# Patient Record
Sex: FEMALE | Race: WHITE | Employment: FULL TIME | URBAN - METROPOLITAN AREA
[De-identification: names, ages, dates, MRNs, and addresses within clinical notes are randomized per-mention and may not be internally consistent; named-entity substitution may affect disease eponyms.]

---

## 2017-02-01 ENCOUNTER — TELEPHONE (OUTPATIENT)
Dept: FAMILY MEDICINE CLINIC | Age: 57
End: 2017-02-01

## 2017-02-01 ENCOUNTER — OFFICE VISIT (OUTPATIENT)
Dept: FAMILY MEDICINE CLINIC | Age: 57
End: 2017-02-01

## 2017-02-01 VITALS
OXYGEN SATURATION: 96 % | WEIGHT: 256 LBS | DIASTOLIC BLOOD PRESSURE: 68 MMHG | SYSTOLIC BLOOD PRESSURE: 128 MMHG | BODY MASS INDEX: 42.65 KG/M2 | HEART RATE: 66 BPM | HEIGHT: 65 IN

## 2017-02-01 DIAGNOSIS — E66.01 MORBID OBESITY, UNSPECIFIED OBESITY TYPE (HCC): ICD-10-CM

## 2017-02-01 DIAGNOSIS — E03.9 ACQUIRED HYPOTHYROIDISM: Primary | ICD-10-CM

## 2017-02-01 DIAGNOSIS — Z13.9 SCREENING: ICD-10-CM

## 2017-02-01 DIAGNOSIS — E03.9 ACQUIRED HYPOTHYROIDISM: ICD-10-CM

## 2017-02-01 LAB
T3 FREE: 3.3 PG/ML (ref 2.3–4.2)
T4 FREE: 1.3 NG/DL (ref 0.9–1.8)
TSH SERPL DL<=0.05 MIU/L-ACNC: 5.01 UIU/ML (ref 0.27–4.2)

## 2017-02-01 PROCEDURE — 99204 OFFICE O/P NEW MOD 45 MIN: CPT | Performed by: FAMILY MEDICINE

## 2017-02-01 RX ORDER — LEVOTHYROXINE SODIUM 0.03 MG/1
25 TABLET ORAL DAILY
COMMUNITY
End: 2017-02-01

## 2017-02-01 RX ORDER — LEVOTHYROXINE SODIUM 0.05 MG/1
50 TABLET ORAL DAILY
Qty: 30 TABLET | Refills: 3 | Status: SHIPPED | OUTPATIENT
Start: 2017-02-01 | End: 2017-04-21 | Stop reason: DRUGHIGH

## 2017-02-01 ASSESSMENT — ENCOUNTER SYMPTOMS
NAUSEA: 0
SHORTNESS OF BREATH: 0
CONSTIPATION: 0
EYE REDNESS: 0
DIARRHEA: 0
VOMITING: 0

## 2017-02-02 ENCOUNTER — TELEPHONE (OUTPATIENT)
Dept: FAMILY MEDICINE CLINIC | Age: 57
End: 2017-02-02

## 2017-02-04 LAB — T3 REVERSE: 18.7 NG/DL (ref 9–27)

## 2017-04-07 ENCOUNTER — OFFICE VISIT (OUTPATIENT)
Dept: FAMILY MEDICINE CLINIC | Age: 57
End: 2017-04-07

## 2017-04-07 VITALS
DIASTOLIC BLOOD PRESSURE: 80 MMHG | BODY MASS INDEX: 42.15 KG/M2 | HEIGHT: 65 IN | SYSTOLIC BLOOD PRESSURE: 124 MMHG | HEART RATE: 81 BPM | WEIGHT: 253 LBS | OXYGEN SATURATION: 96 %

## 2017-04-07 DIAGNOSIS — E03.9 ACQUIRED HYPOTHYROIDISM: Primary | ICD-10-CM

## 2017-04-07 DIAGNOSIS — E66.01 MORBID OBESITY, UNSPECIFIED OBESITY TYPE (HCC): ICD-10-CM

## 2017-04-07 LAB
T4 FREE: 1.4 NG/DL (ref 0.9–1.8)
TSH SERPL DL<=0.05 MIU/L-ACNC: 4.83 UIU/ML (ref 0.27–4.2)

## 2017-04-07 PROCEDURE — 99214 OFFICE O/P EST MOD 30 MIN: CPT | Performed by: FAMILY MEDICINE

## 2017-04-07 ASSESSMENT — ENCOUNTER SYMPTOMS
EYE REDNESS: 0
COLOR CHANGE: 0
NAUSEA: 0
EYE DISCHARGE: 0
ABDOMINAL PAIN: 0
SHORTNESS OF BREATH: 0

## 2017-04-21 ENCOUNTER — OFFICE VISIT (OUTPATIENT)
Dept: FAMILY MEDICINE CLINIC | Age: 57
End: 2017-04-21

## 2017-04-21 VITALS
OXYGEN SATURATION: 94 % | HEIGHT: 65 IN | BODY MASS INDEX: 41.65 KG/M2 | HEART RATE: 68 BPM | SYSTOLIC BLOOD PRESSURE: 122 MMHG | DIASTOLIC BLOOD PRESSURE: 78 MMHG | WEIGHT: 250 LBS

## 2017-04-21 DIAGNOSIS — E03.9 ACQUIRED HYPOTHYROIDISM: Primary | ICD-10-CM

## 2017-04-21 DIAGNOSIS — R79.89 ELEVATED TSH: ICD-10-CM

## 2017-04-21 DIAGNOSIS — E66.01 MORBID OBESITY, UNSPECIFIED OBESITY TYPE (HCC): ICD-10-CM

## 2017-04-21 PROCEDURE — 99214 OFFICE O/P EST MOD 30 MIN: CPT | Performed by: FAMILY MEDICINE

## 2017-04-21 RX ORDER — LEVOTHYROXINE SODIUM 0.07 MG/1
75 TABLET ORAL DAILY
Qty: 30 TABLET | Refills: 2 | Status: SHIPPED | OUTPATIENT
Start: 2017-04-21 | End: 2017-07-18 | Stop reason: SDUPTHER

## 2017-04-21 ASSESSMENT — PATIENT HEALTH QUESTIONNAIRE - PHQ9
1. LITTLE INTEREST OR PLEASURE IN DOING THINGS: 0
SUM OF ALL RESPONSES TO PHQ QUESTIONS 1-9: 0
2. FEELING DOWN, DEPRESSED OR HOPELESS: 0
SUM OF ALL RESPONSES TO PHQ9 QUESTIONS 1 & 2: 0

## 2017-04-21 ASSESSMENT — ENCOUNTER SYMPTOMS
SHORTNESS OF BREATH: 0
ABDOMINAL PAIN: 0
NAUSEA: 0
EYE REDNESS: 0
EYE DISCHARGE: 0
COLOR CHANGE: 0

## 2017-06-06 ENCOUNTER — OFFICE VISIT (OUTPATIENT)
Dept: FAMILY MEDICINE CLINIC | Age: 57
End: 2017-06-06

## 2017-06-06 VITALS
DIASTOLIC BLOOD PRESSURE: 78 MMHG | SYSTOLIC BLOOD PRESSURE: 128 MMHG | HEIGHT: 65 IN | BODY MASS INDEX: 41.32 KG/M2 | HEART RATE: 72 BPM | WEIGHT: 248 LBS | OXYGEN SATURATION: 93 %

## 2017-06-06 DIAGNOSIS — M79.601 ARM PAIN, LATERAL, RIGHT: Primary | ICD-10-CM

## 2017-06-06 DIAGNOSIS — Z13.9 SCREENING: ICD-10-CM

## 2017-06-06 PROCEDURE — 99214 OFFICE O/P EST MOD 30 MIN: CPT | Performed by: FAMILY MEDICINE

## 2017-06-06 ASSESSMENT — ENCOUNTER SYMPTOMS
SHORTNESS OF BREATH: 0
COLOR CHANGE: 0
NAUSEA: 0
ABDOMINAL PAIN: 0
EYE REDNESS: 0
EYE DISCHARGE: 0

## 2017-06-08 ENCOUNTER — HOSPITAL ENCOUNTER (OUTPATIENT)
Dept: MAMMOGRAPHY | Age: 57
Discharge: HOME | End: 2017-06-08
Admitting: FAMILY MEDICINE

## 2017-06-08 DIAGNOSIS — Z12.31 ENCOUNTER FOR SCREENING MAMMOGRAM FOR BREAST CANCER: ICD-10-CM

## 2017-06-08 DIAGNOSIS — M79.601 ARM PAIN, LATERAL, RIGHT: ICD-10-CM

## 2017-06-28 ENCOUNTER — OFFICE VISIT (OUTPATIENT)
Dept: FAMILY MEDICINE CLINIC | Age: 57
End: 2017-06-28

## 2017-06-28 VITALS
BODY MASS INDEX: 41.15 KG/M2 | HEIGHT: 65 IN | DIASTOLIC BLOOD PRESSURE: 80 MMHG | OXYGEN SATURATION: 94 % | HEART RATE: 69 BPM | WEIGHT: 247 LBS | SYSTOLIC BLOOD PRESSURE: 116 MMHG

## 2017-06-28 DIAGNOSIS — L03.115 CELLULITIS OF RIGHT LOWER EXTREMITY: Primary | ICD-10-CM

## 2017-06-28 DIAGNOSIS — Z13.9 SCREENING: ICD-10-CM

## 2017-06-28 PROCEDURE — 99214 OFFICE O/P EST MOD 30 MIN: CPT | Performed by: FAMILY MEDICINE

## 2017-06-28 RX ORDER — CEPHALEXIN 500 MG/1
500 CAPSULE ORAL 2 TIMES DAILY
Qty: 20 CAPSULE | Refills: 0 | Status: SHIPPED | OUTPATIENT
Start: 2017-06-28 | End: 2017-09-15 | Stop reason: ALTCHOICE

## 2017-06-28 ASSESSMENT — ENCOUNTER SYMPTOMS
ABDOMINAL PAIN: 0
SHORTNESS OF BREATH: 0
NAUSEA: 0
COLOR CHANGE: 1
EYE REDNESS: 0
EYE DISCHARGE: 0

## 2017-07-17 DIAGNOSIS — E03.9 ACQUIRED HYPOTHYROIDISM: ICD-10-CM

## 2017-07-17 LAB
T4 FREE: 1.4 NG/DL (ref 0.9–1.8)
TSH SERPL DL<=0.05 MIU/L-ACNC: 3.64 UIU/ML (ref 0.27–4.2)

## 2017-07-18 RX ORDER — LEVOTHYROXINE SODIUM 0.07 MG/1
75 TABLET ORAL DAILY
Qty: 30 TABLET | Refills: 11 | Status: SHIPPED | OUTPATIENT
Start: 2017-07-18 | End: 2018-01-02

## 2017-08-29 DIAGNOSIS — E03.9 ACQUIRED HYPOTHYROIDISM: Primary | ICD-10-CM

## 2017-09-01 DIAGNOSIS — E03.9 ACQUIRED HYPOTHYROIDISM: ICD-10-CM

## 2017-09-01 LAB — TSH SERPL DL<=0.05 MIU/L-ACNC: 4.03 UIU/ML (ref 0.27–4.2)

## 2017-09-15 ENCOUNTER — OFFICE VISIT (OUTPATIENT)
Dept: FAMILY MEDICINE CLINIC | Age: 57
End: 2017-09-15

## 2017-09-15 VITALS
HEIGHT: 65 IN | WEIGHT: 242 LBS | DIASTOLIC BLOOD PRESSURE: 86 MMHG | BODY MASS INDEX: 40.32 KG/M2 | SYSTOLIC BLOOD PRESSURE: 130 MMHG | OXYGEN SATURATION: 94 % | HEART RATE: 71 BPM

## 2017-09-15 DIAGNOSIS — E03.9 ACQUIRED HYPOTHYROIDISM: ICD-10-CM

## 2017-09-15 DIAGNOSIS — E66.01 MORBID OBESITY, UNSPECIFIED OBESITY TYPE (HCC): ICD-10-CM

## 2017-09-15 DIAGNOSIS — E03.9 ACQUIRED HYPOTHYROIDISM: Primary | ICD-10-CM

## 2017-09-15 DIAGNOSIS — M79.10 MYALGIA: ICD-10-CM

## 2017-09-15 LAB — TSH SERPL DL<=0.05 MIU/L-ACNC: 4.12 UIU/ML (ref 0.27–4.2)

## 2017-09-15 PROCEDURE — 99214 OFFICE O/P EST MOD 30 MIN: CPT | Performed by: FAMILY MEDICINE

## 2017-09-15 PROCEDURE — 90715 TDAP VACCINE 7 YRS/> IM: CPT | Performed by: FAMILY MEDICINE

## 2017-09-15 PROCEDURE — 90472 IMMUNIZATION ADMIN EACH ADD: CPT | Performed by: FAMILY MEDICINE

## 2017-09-15 PROCEDURE — 90688 IIV4 VACCINE SPLT 0.5 ML IM: CPT | Performed by: FAMILY MEDICINE

## 2017-09-15 PROCEDURE — 90471 IMMUNIZATION ADMIN: CPT | Performed by: FAMILY MEDICINE

## 2017-09-15 ASSESSMENT — ENCOUNTER SYMPTOMS
EYE DISCHARGE: 0
ABDOMINAL PAIN: 0
EYE REDNESS: 0
NAUSEA: 0
COLOR CHANGE: 0
SHORTNESS OF BREATH: 0

## 2017-09-18 DIAGNOSIS — E03.9 ACQUIRED HYPOTHYROIDISM: Primary | ICD-10-CM

## 2017-09-18 LAB — T4 FREE: 1.5 NG/DL (ref 0.9–1.8)

## 2017-11-29 ENCOUNTER — OFFICE VISIT (OUTPATIENT)
Dept: FAMILY MEDICINE CLINIC | Age: 57
End: 2017-11-29

## 2017-11-29 VITALS
OXYGEN SATURATION: 95 % | SYSTOLIC BLOOD PRESSURE: 130 MMHG | WEIGHT: 240 LBS | HEIGHT: 65 IN | BODY MASS INDEX: 39.99 KG/M2 | DIASTOLIC BLOOD PRESSURE: 86 MMHG | TEMPERATURE: 101.4 F | HEART RATE: 60 BPM

## 2017-11-29 DIAGNOSIS — J20.9 ACUTE BRONCHITIS, UNSPECIFIED ORGANISM: Primary | ICD-10-CM

## 2017-11-29 DIAGNOSIS — R05.9 COUGH: ICD-10-CM

## 2017-11-29 PROCEDURE — 99213 OFFICE O/P EST LOW 20 MIN: CPT | Performed by: NURSE PRACTITIONER

## 2017-11-29 RX ORDER — AZITHROMYCIN 250 MG/1
250 TABLET, FILM COATED ORAL DAILY
Qty: 6 TABLET | Refills: 0 | Status: SHIPPED | OUTPATIENT
Start: 2017-11-29 | End: 2017-12-20

## 2017-11-29 RX ORDER — BENZONATATE 100 MG/1
100 CAPSULE ORAL 3 TIMES DAILY PRN
Qty: 21 CAPSULE | Refills: 0 | Status: SHIPPED | OUTPATIENT
Start: 2017-11-29 | End: 2017-12-06

## 2017-11-29 RX ORDER — METHYLPREDNISOLONE 4 MG/1
TABLET ORAL
Qty: 1 KIT | Refills: 0 | Status: SHIPPED | OUTPATIENT
Start: 2017-11-29 | End: 2017-12-20

## 2017-11-29 RX ORDER — DEXTROMETHORPHAN HYDROBROMIDE AND PROMETHAZINE HYDROCHLORIDE 15; 6.25 MG/5ML; MG/5ML
5 SYRUP ORAL 4 TIMES DAILY PRN
Qty: 118 ML | Refills: 0 | Status: SHIPPED | OUTPATIENT
Start: 2017-11-29 | End: 2017-12-20 | Stop reason: SDUPTHER

## 2017-11-29 ASSESSMENT — ENCOUNTER SYMPTOMS
SHORTNESS OF BREATH: 1
SORE THROAT: 1
COUGH: 1
RHINORRHEA: 1
WHEEZING: 0

## 2017-11-29 NOTE — LETTER
[address]  [address] [place] [address], [facility] [address]  Phone: [phone]  Fax: [phone]    Brittany Barreto, [facility]        November 29, 2017     Patient: Shawn Del Rosario   YOB: 1960   Date of Visit: 11/29/2017       To Whom it May Concern:    Shawn Del Rosario was seen in my clinic on 11/29/2017 for illness. She has been off of work since 11/27/17 due to illness. She may return to work when she has been free from fever for 24 hours. If you have any questions or concerns, please don't hesitate to call.     Sincerely,         Brittany Barreto, CNP

## 2017-11-29 NOTE — PROGRESS NOTES
Subjective:      Patient ID: Marisa Silva is a 55 y.o. female. Ana Maria is here for sore throat and cough that started 4 days ago and has progressively worsened. Cough is productive of green tinged sputum and frequent throughout the day and night. She is also having muscle aches and fatigue. Has shortness of breath with coughing and walking. Has not been able to work and cough keeps her up at night. Was exposed to a family member who was sick on Saturday. Cough   This is a new problem. Episode onset: 4 days ago. The problem has been gradually worsening. The problem occurs every few minutes. The cough is productive of sputum. Associated symptoms include chest pain, myalgias, nasal congestion, postnasal drip, rhinorrhea, a sore throat and shortness of breath. Pertinent negatives include no chills, ear pain, headaches, rash or wheezing. Associated symptoms comments: Itchy ears. Exacerbated by: worse in the morning and at night  Treatments tried: mucinex, dayquil, nyquil, afrin. The treatment provided mild relief. There is no history of asthma. Review of Systems   Constitutional: Negative for chills. HENT: Positive for postnasal drip, rhinorrhea and sore throat. Negative for ear pain. Respiratory: Positive for cough and shortness of breath. Negative for wheezing. Cardiovascular: Positive for chest pain. Musculoskeletal: Positive for myalgias. Skin: Negative for rash. Neurological: Negative for headaches. Vitals:    11/29/17 1034   BP: 130/86   Site: Left Arm   Position: Sitting   Pulse: 60   Temp: 101.4 °F (38.6 °C)   TempSrc: Oral   SpO2: 95%   Weight: 240 lb (108.9 kg)   Height: 5' 5\" (1.651 m)     Objective:   Physical Exam   Constitutional: She is oriented to person, place, and time. She appears well-developed and well-nourished. No distress. HENT:   Head: Normocephalic and atraumatic. Right Ear: Hearing, external ear and ear canal normal. Tympanic membrane is erythematous.    Left Ear: Hearing, external ear and ear canal normal. Tympanic membrane is erythematous. Nose: Rhinorrhea present. Mouth/Throat: Posterior oropharyngeal erythema present. No oropharyngeal exudate, posterior oropharyngeal edema or tonsillar abscesses. Neck: Normal range of motion. Neck supple. No tracheal deviation present. No thyromegaly present. Cardiovascular: Normal rate, regular rhythm and normal heart sounds. Exam reveals no gallop and no friction rub. No murmur heard. Pulmonary/Chest: Effort normal and breath sounds normal. No stridor. No respiratory distress. She has no wheezes. She has no rales. She exhibits no tenderness. Lymphadenopathy:     She has no cervical adenopathy. Neurological: She is alert and oriented to person, place, and time. Skin: Skin is warm and dry. She is not diaphoretic. Nursing note and vitals reviewed. Assessment/Plan:   Ana Maria was seen today for cough. Diagnoses and all orders for this visit:    1. Acute bronchitis, unspecified organism  - azithromycin (ZITHROMAX) 250 MG tablet; Take 1 tablet by mouth daily 500MG PO ON DAY 1 250 MG DAILY ON DAYS 2-5  Dispense: 6 tablet; Refill: 0  - benzonatate (TESSALON PERLES) 100 MG capsule; Take 1 capsule by mouth 3 times daily as needed for Cough  Dispense: 21 capsule; Refill: 0  - promethazine-dextromethorphan (PROMETHAZINE-DM) 6.25-15 MG/5ML syrup; Take 5 mLs by mouth 4 times daily as needed for Cough  Dispense: 118 mL; Refill: 0  - methylPREDNISolone (MEDROL DOSEPACK) 4 MG tablet; Take by mouth. Dispense: 1 kit; Refill: 0    2.  Cough  - RAPID INFLUENZA A/B ANTIGENS; Future- Negative    Patient Instructions   Z pack- antibiotic  Medrol (Do not take any NSAID's such as ibuprofen or aleve while on this medication)  Promethazine DM at night for cough  Tessalon perles for cough during the day  Follow up Monday if no better    Outpatient Encounter Prescriptions as of 11/29/2017   Medication Sig Dispense Refill    azithromycin (ZITHROMAX) 250 MG tablet Take 1 tablet by mouth daily 500MG PO ON DAY 1 250 MG DAILY ON DAYS 2-5 6 tablet 0    benzonatate (TESSALON PERLES) 100 MG capsule Take 1 capsule by mouth 3 times daily as needed for Cough 21 capsule 0    promethazine-dextromethorphan (PROMETHAZINE-DM) 6.25-15 MG/5ML syrup Take 5 mLs by mouth 4 times daily as needed for Cough 118 mL 0    methylPREDNISolone (MEDROL DOSEPACK) 4 MG tablet Take by mouth. 1 kit 0    levothyroxine (SYNTHROID) 75 MCG tablet Take 1 tablet by mouth daily 30 tablet 11     No facility-administered encounter medications on file as of 11/29/2017.

## 2017-11-29 NOTE — PATIENT INSTRUCTIONS
Z pack- antibiotic  Medrol (Do not take any NSAID's such as ibuprofen or aleve while on this medication)  Promethazine DM at night for cough  Tessalon perles for cough during the day  Follow up Monday if no better

## 2017-12-20 ENCOUNTER — OFFICE VISIT (OUTPATIENT)
Dept: FAMILY MEDICINE CLINIC | Age: 57
End: 2017-12-20

## 2017-12-20 VITALS
HEIGHT: 65 IN | WEIGHT: 242 LBS | SYSTOLIC BLOOD PRESSURE: 122 MMHG | DIASTOLIC BLOOD PRESSURE: 82 MMHG | BODY MASS INDEX: 40.32 KG/M2 | OXYGEN SATURATION: 96 % | HEART RATE: 81 BPM

## 2017-12-20 DIAGNOSIS — J20.9 ACUTE BRONCHITIS, UNSPECIFIED ORGANISM: ICD-10-CM

## 2017-12-20 PROCEDURE — 99213 OFFICE O/P EST LOW 20 MIN: CPT | Performed by: NURSE PRACTITIONER

## 2017-12-20 RX ORDER — AZITHROMYCIN 250 MG/1
250 TABLET, FILM COATED ORAL DAILY
Qty: 6 TABLET | Refills: 0 | Status: SHIPPED | OUTPATIENT
Start: 2017-12-20 | End: 2017-12-27 | Stop reason: ALTCHOICE

## 2017-12-20 RX ORDER — METHYLPREDNISOLONE 4 MG/1
TABLET ORAL
Qty: 1 KIT | Refills: 0 | Status: SHIPPED | OUTPATIENT
Start: 2017-12-20 | End: 2017-12-27 | Stop reason: ALTCHOICE

## 2017-12-20 RX ORDER — DEXTROMETHORPHAN HYDROBROMIDE AND PROMETHAZINE HYDROCHLORIDE 15; 6.25 MG/5ML; MG/5ML
5 SYRUP ORAL 4 TIMES DAILY PRN
Qty: 118 ML | Refills: 0 | Status: SHIPPED | OUTPATIENT
Start: 2017-12-20 | End: 2017-12-27 | Stop reason: ALTCHOICE

## 2017-12-20 ASSESSMENT — ENCOUNTER SYMPTOMS
COUGH: 1
CHEST TIGHTNESS: 0
RHINORRHEA: 1
STRIDOR: 0
SHORTNESS OF BREATH: 0
CHOKING: 0
SINUS PAIN: 1
HOARSE VOICE: 0
FACIAL SWELLING: 0
WHEEZING: 0
SINUS COMPLAINT: 1
SINUS PRESSURE: 1
SORE THROAT: 1

## 2017-12-20 NOTE — PROGRESS NOTES
EOM are normal. Pupils are equal, round, and reactive to light. Neck: Normal range of motion. Neck supple. Cardiovascular: Normal rate, regular rhythm and normal heart sounds. Exam reveals no gallop and no friction rub. No murmur heard. Pulmonary/Chest: Effort normal and breath sounds normal. No respiratory distress. She has no wheezes. She has no rales. She exhibits no tenderness. Lymphadenopathy:     She has no cervical adenopathy. Neurological: She is oriented to person, place, and time. No cranial nerve deficit. Skin: Skin is warm and dry. She is not diaphoretic. Nursing note and vitals reviewed. Assessment:   1. Acute bronchitis, unspecified organism  - methylPREDNISolone (MEDROL DOSEPACK) 4 MG tablet; Take by mouth. Dispense: 1 kit; Refill: 0  - promethazine-dextromethorphan (PROMETHAZINE-DM) 6.25-15 MG/5ML syrup; Take 5 mLs by mouth 4 times daily as needed for Cough  Dispense: 118 mL; Refill: 0  - azithromycin (ZITHROMAX) 250 MG tablet; Take 1 tablet by mouth daily 500MG PO ON DAY 1 250 MG DAILY ON DAYS 2-5  Dispense: 6 tablet; Refill: 0     Plan:     Patient Instructions   Medrol dose pack (oral steroid) no NSAIDs while taking  Promethazine-DM for cough  Azithromycin   Follow up in 1 week if no better    Outpatient Encounter Prescriptions as of 12/20/2017   Medication Sig Dispense Refill    methylPREDNISolone (MEDROL DOSEPACK) 4 MG tablet Take by mouth.  1 kit 0    promethazine-dextromethorphan (PROMETHAZINE-DM) 6.25-15 MG/5ML syrup Take 5 mLs by mouth 4 times daily as needed for Cough 118 mL 0    azithromycin (ZITHROMAX) 250 MG tablet Take 1 tablet by mouth daily 500MG PO ON DAY 1 250 MG DAILY ON DAYS 2-5 6 tablet 0    levothyroxine (SYNTHROID) 75 MCG tablet Take 1 tablet by mouth daily 30 tablet 11    [DISCONTINUED] azithromycin (ZITHROMAX) 250 MG tablet Take 1 tablet by mouth daily 500MG PO ON DAY 1 250 MG DAILY ON DAYS 2-5 6 tablet 0    [DISCONTINUED] promethazine-dextromethorphan (PROMETHAZINE-DM) 6.25-15 MG/5ML syrup Take 5 mLs by mouth 4 times daily as needed for Cough 118 mL 0    [DISCONTINUED] methylPREDNISolone (MEDROL DOSEPACK) 4 MG tablet Take by mouth. 1 kit 0     No facility-administered encounter medications on file as of 12/20/2017.

## 2017-12-27 ENCOUNTER — HOSPITAL ENCOUNTER (OUTPATIENT)
Dept: GENERAL RADIOLOGY | Age: 57
Discharge: HOME | End: 2017-12-27
Attending: INTERNAL MEDICINE | Admitting: INTERNAL MEDICINE

## 2017-12-27 ENCOUNTER — OFFICE VISIT (OUTPATIENT)
Dept: ENDOCRINOLOGY | Age: 57
End: 2017-12-27

## 2017-12-27 VITALS
OXYGEN SATURATION: 94 % | BODY MASS INDEX: 40.85 KG/M2 | TEMPERATURE: 97.5 F | DIASTOLIC BLOOD PRESSURE: 84 MMHG | WEIGHT: 245.2 LBS | HEART RATE: 64 BPM | HEIGHT: 65 IN | RESPIRATION RATE: 12 BRPM | SYSTOLIC BLOOD PRESSURE: 126 MMHG

## 2017-12-27 DIAGNOSIS — E03.9 ACQUIRED HYPOTHYROIDISM: ICD-10-CM

## 2017-12-27 DIAGNOSIS — R20.2 NUMBNESS AND TINGLING: ICD-10-CM

## 2017-12-27 DIAGNOSIS — R20.0 NUMBNESS AND TINGLING: ICD-10-CM

## 2017-12-27 DIAGNOSIS — E55.9 VITAMIN D DEFICIENCY: ICD-10-CM

## 2017-12-27 DIAGNOSIS — E03.9 ACQUIRED HYPOTHYROIDISM: Primary | ICD-10-CM

## 2017-12-27 LAB
FOLATE: 17.48 NG/ML (ref 4.78–24.2)
T4 FREE: 1.3 NG/DL (ref 0.9–1.8)
THYROID PEROXIDASE (TPO) ABS: 81 IU/ML
TSH SERPL DL<=0.05 MIU/L-ACNC: 5.21 UIU/ML (ref 0.27–4.2)
VITAMIN B-12: 788 PG/ML (ref 211–911)
VITAMIN D 25-HYDROXY: 45.5 NG/ML

## 2017-12-27 PROCEDURE — 99244 OFF/OP CNSLTJ NEW/EST MOD 40: CPT | Performed by: INTERNAL MEDICINE

## 2017-12-27 NOTE — PROGRESS NOTES
Endocrinology       New Patient Consultation  Alicia Pierre M.D. Phone: [phone]   FAX: [phone]       [address]   YOB: 1960    Date of Visit:  12/27/2017    No Known Allergies  No outpatient prescriptions have been marked as taking for the 12/27/17 encounter (Office Visit) with Nakul Angelo MD.         Vitals:    12/27/17 1303   BP: 126/84   Site: Right Arm   Position: Sitting   Cuff Size: Large Adult   Pulse: 64   Resp: 12   Temp: 97.5 °F (36.4 °C)   TempSrc: Oral   SpO2: 94%   Weight: 245 lb 3.2 oz (111.2 kg)   Height: 5' 4.69\" (1.643 m)     Body mass index is 41.2 kg/m². Wt Readings from Last 3 Encounters:   12/27/17 245 lb 3.2 oz (111.2 kg)   12/20/17 242 lb (109.8 kg)   11/29/17 240 lb (108.9 kg)     BP Readings from Last 3 Encounters:   12/27/17 126/84   12/20/17 122/82   11/29/17 130/86        Past Medical History:   Diagnosis Date    Acquired hypothyroidism 2/1/2017    Hyperthyroidism     Morbid obesity ([address] 2/1/2017     Past Surgical History:   Procedure Laterality Date    BACK SURGERY  2010     Family History   Problem Relation Age of Onset    Heart Disease Mother      History   Smoking Status    Former Smoker    Quit date: 2/1/2010   Smokeless Tobacco    Never Used      History   Alcohol Use No       HPI      Bria Sharp is a 65 y.o. female who is here for initial evaluation of thyroid disease  Patient is being seen at the request of Ladonna Mccormick DO        Diagnosed with Hypothyroidism in 2016  Current thyroid medication: levothyroxine (Synthroid)- 75  mcg daily  Takes it first thing in the morning on empty stomach- yes  Interfering medications including calcium, vitamin D , iron tablets, Proton pump inhibitors: no    FH of hypothyroidism: no  FH of thyroid cancer: no      C/o fatigue, weight gain , muscle weakness, constipation      Review of system Please see the scanned document filled by the patient, reviewed  by me today.      Physical Exam

## 2017-12-27 NOTE — Clinical Note
Dear Dr. Ian Hill,  Thank you so much for involving me in the care of your patient. Please review the enclosed note. Feel free to call me with any questions.  Warm Regards, Delories Leventhal

## 2018-01-02 RX ORDER — LEVOTHYROXINE SODIUM 88 MCG
88 TABLET ORAL DAILY
Qty: 30 TABLET | Refills: 3 | Status: SHIPPED | OUTPATIENT
Start: 2018-01-02 | End: 2018-04-30 | Stop reason: SDUPTHER

## 2018-04-04 ENCOUNTER — OFFICE VISIT (OUTPATIENT)
Dept: FAMILY MEDICINE CLINIC | Age: 58
End: 2018-04-04

## 2018-04-04 VITALS
DIASTOLIC BLOOD PRESSURE: 80 MMHG | HEART RATE: 68 BPM | OXYGEN SATURATION: 98 % | SYSTOLIC BLOOD PRESSURE: 122 MMHG | WEIGHT: 241 LBS | BODY MASS INDEX: 41.15 KG/M2 | HEIGHT: 64 IN

## 2018-04-04 DIAGNOSIS — M54.2 NECK PAIN: ICD-10-CM

## 2018-04-04 DIAGNOSIS — M62.838 MUSCLE SPASMS OF NECK: Primary | ICD-10-CM

## 2018-04-04 PROCEDURE — 99213 OFFICE O/P EST LOW 20 MIN: CPT | Performed by: NURSE PRACTITIONER

## 2018-04-04 RX ORDER — METHOCARBAMOL 500 MG/1
500 TABLET, FILM COATED ORAL 3 TIMES DAILY
Qty: 30 TABLET | Refills: 0 | Status: SHIPPED | OUTPATIENT
Start: 2018-04-04 | End: 2018-04-27 | Stop reason: ALTCHOICE

## 2018-04-04 ASSESSMENT — ENCOUNTER SYMPTOMS
PHOTOPHOBIA: 0
TROUBLE SWALLOWING: 0
VISUAL CHANGE: 0

## 2018-04-27 ENCOUNTER — OFFICE VISIT (OUTPATIENT)
Dept: ENDOCRINOLOGY | Age: 58
End: 2018-04-27

## 2018-04-27 ENCOUNTER — HOSPITAL ENCOUNTER (OUTPATIENT)
Dept: GENERAL RADIOLOGY | Age: 58
Discharge: HOME | End: 2018-04-27
Attending: INTERNAL MEDICINE | Admitting: INTERNAL MEDICINE

## 2018-04-27 VITALS
HEIGHT: 64 IN | HEART RATE: 70 BPM | SYSTOLIC BLOOD PRESSURE: 135 MMHG | DIASTOLIC BLOOD PRESSURE: 77 MMHG | OXYGEN SATURATION: 95 % | BODY MASS INDEX: 41.15 KG/M2 | WEIGHT: 241 LBS

## 2018-04-27 DIAGNOSIS — E03.9 ACQUIRED HYPOTHYROIDISM: Primary | ICD-10-CM

## 2018-04-27 DIAGNOSIS — E03.9 ACQUIRED HYPOTHYROIDISM: ICD-10-CM

## 2018-04-27 LAB
T4 FREE: 1.5 NG/DL (ref 0.9–1.8)
TSH SERPL DL<=0.05 MIU/L-ACNC: 2.77 UIU/ML (ref 0.27–4.2)

## 2018-04-27 PROCEDURE — 99213 OFFICE O/P EST LOW 20 MIN: CPT | Performed by: INTERNAL MEDICINE

## 2018-04-27 ASSESSMENT — ENCOUNTER SYMPTOMS
COUGH: 0
DOUBLE VISION: 0
BACK PAIN: 0
BLURRED VISION: 0
HEMOPTYSIS: 0
PHOTOPHOBIA: 0
ORTHOPNEA: 0

## 2018-04-27 ASSESSMENT — PATIENT HEALTH QUESTIONNAIRE - PHQ9
1. LITTLE INTEREST OR PLEASURE IN DOING THINGS: 0
SUM OF ALL RESPONSES TO PHQ QUESTIONS 1-9: 0
SUM OF ALL RESPONSES TO PHQ9 QUESTIONS 1 & 2: 0
2. FEELING DOWN, DEPRESSED OR HOPELESS: 0

## 2018-04-30 RX ORDER — LEVOTHYROXINE SODIUM 88 MCG
88 TABLET ORAL DAILY
Qty: 30 TABLET | Refills: 5 | Status: SHIPPED | OUTPATIENT
Start: 2018-04-30 | End: 2018-11-05 | Stop reason: SDUPTHER

## 2018-05-10 ENCOUNTER — TELEPHONE (OUTPATIENT)
Dept: OTHER | Facility: CLINIC | Age: 58
End: 2018-05-10

## 2018-07-19 ENCOUNTER — OFFICE VISIT (OUTPATIENT)
Dept: FAMILY MEDICINE CLINIC | Age: 58
End: 2018-07-19

## 2018-07-19 VITALS
HEART RATE: 74 BPM | WEIGHT: 232 LBS | SYSTOLIC BLOOD PRESSURE: 118 MMHG | HEIGHT: 64 IN | DIASTOLIC BLOOD PRESSURE: 86 MMHG | OXYGEN SATURATION: 96 % | BODY MASS INDEX: 39.61 KG/M2

## 2018-07-19 DIAGNOSIS — L50.9 HIVE: Primary | ICD-10-CM
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
## 2018-07-19 DIAGNOSIS — Z12.11 COLON CANCER SCREENING: ICD-10-CM

## 2018-07-19 PROCEDURE — 99213 OFFICE O/P EST LOW 20 MIN: CPT | Performed by: NURSE PRACTITIONER

## 2018-07-19 RX ORDER — METHYLPREDNISOLONE 4 MG/1
TABLET ORAL
Qty: 1 KIT | Refills: 0 | Status: SHIPPED | OUTPATIENT
Start: 2018-07-19 | End: 2018-10-23 | Stop reason: ALTCHOICE

## 2018-07-19 RX ORDER — HYDROXYZINE HYDROCHLORIDE 25 MG/1
25 TABLET, FILM COATED ORAL 3 TIMES DAILY PRN
Qty: 30 TABLET | Refills: 0 | Status: SHIPPED | OUTPATIENT
Start: 2018-07-19 | End: 2018-10-23 | Stop reason: ALTCHOICE

## 2018-07-19 ASSESSMENT — ENCOUNTER SYMPTOMS
WHEEZING: 0
COLOR CHANGE: 1
COUGH: 0
STRIDOR: 0
SHORTNESS OF BREATH: 0

## 2018-09-14 ENCOUNTER — HOSPITAL ENCOUNTER (OUTPATIENT)
Dept: MAMMOGRAPHY | Age: 58
Discharge: HOME | End: 2018-09-14
Payer: COMMERCIAL

## 2018-09-14 DIAGNOSIS — Z12.31 ENCOUNTER FOR SCREENING MAMMOGRAM FOR BREAST CANCER: ICD-10-CM

## 2018-09-14 PROCEDURE — 77067 SCR MAMMO BI INCL CAD: CPT

## 2018-10-23 ENCOUNTER — OFFICE VISIT (OUTPATIENT)
Dept: FAMILY MEDICINE CLINIC | Age: 58
End: 2018-10-23
Payer: COMMERCIAL

## 2018-10-23 VITALS
OXYGEN SATURATION: 96 % | HEIGHT: 64 IN | HEART RATE: 69 BPM | BODY MASS INDEX: 39.95 KG/M2 | SYSTOLIC BLOOD PRESSURE: 130 MMHG | WEIGHT: 234 LBS | DIASTOLIC BLOOD PRESSURE: 100 MMHG

## 2018-10-23 DIAGNOSIS — Z12.11 SCREEN FOR COLON CANCER: ICD-10-CM

## 2018-10-23 DIAGNOSIS — Z87.891 HISTORY OF SMOKING 10-25 PACK YEARS: ICD-10-CM

## 2018-10-23 DIAGNOSIS — K14.8 TONGUE LESION: Primary | ICD-10-CM

## 2018-10-23 PROCEDURE — 99213 OFFICE O/P EST LOW 20 MIN: CPT | Performed by: FAMILY MEDICINE

## 2018-10-23 ASSESSMENT — ENCOUNTER SYMPTOMS
FACIAL SWELLING: 0
EYE REDNESS: 0
NAUSEA: 0
SHORTNESS OF BREATH: 0
CONSTIPATION: 0
DIARRHEA: 0
VOMITING: 0
TROUBLE SWALLOWING: 0

## 2018-11-02 ENCOUNTER — HOSPITAL ENCOUNTER (OUTPATIENT)
Age: 58
Discharge: HOME | End: 2018-11-02
Payer: COMMERCIAL

## 2018-11-02 ENCOUNTER — OFFICE VISIT (OUTPATIENT)
Dept: ENDOCRINOLOGY | Age: 58
End: 2018-11-02
Payer: COMMERCIAL

## 2018-11-02 VITALS
DIASTOLIC BLOOD PRESSURE: 83 MMHG | RESPIRATION RATE: 12 BRPM | WEIGHT: 235.4 LBS | SYSTOLIC BLOOD PRESSURE: 119 MMHG | HEIGHT: 64 IN | HEART RATE: 66 BPM | BODY MASS INDEX: 40.19 KG/M2 | OXYGEN SATURATION: 96 %

## 2018-11-02 DIAGNOSIS — E03.9 ACQUIRED HYPOTHYROIDISM: Primary | ICD-10-CM

## 2018-11-02 DIAGNOSIS — E03.9 ACQUIRED HYPOTHYROIDISM: ICD-10-CM

## 2018-11-02 LAB
T4 FREE: 1.6 NG/DL (ref 0.9–1.8)
TSH SERPL DL<=0.05 MIU/L-ACNC: 2.8 UIU/ML (ref 0.27–4.2)

## 2018-11-02 PROCEDURE — 99213 OFFICE O/P EST LOW 20 MIN: CPT | Performed by: INTERNAL MEDICINE

## 2018-11-02 PROCEDURE — 84443 ASSAY THYROID STIM HORMONE: CPT

## 2018-11-02 PROCEDURE — 84439 ASSAY OF FREE THYROXINE: CPT

## 2018-11-02 PROCEDURE — 36415 COLL VENOUS BLD VENIPUNCTURE: CPT

## 2018-11-02 ASSESSMENT — ENCOUNTER SYMPTOMS
ORTHOPNEA: 0
HEMOPTYSIS: 0
DOUBLE VISION: 0
PHOTOPHOBIA: 0
COUGH: 0
BACK PAIN: 0
BLURRED VISION: 0

## 2018-11-02 NOTE — PROGRESS NOTES
of Systems   Constitutional: Positive for malaise/fatigue. Negative for chills, diaphoresis, fever and weight loss. Eyes: Negative for blurred vision, double vision and photophobia. Respiratory: Negative for cough and hemoptysis. Cardiovascular: Negative for chest pain, palpitations and orthopnea. Genitourinary: Negative for dysuria, flank pain, frequency, hematuria and urgency. Musculoskeletal: Positive for myalgias. Negative for back pain, falls, joint pain and neck pain. Skin: Negative for itching and rash. Neurological: Positive for headaches. Negative for dizziness, tingling, tremors, sensory change, speech change, focal weakness, seizures and loss of consciousness. Endo/Heme/Allergies: Negative for environmental allergies and polydipsia. Does not bruise/bleed easily. Psychiatric/Behavioral: Positive for depression. Negative for hallucinations, memory loss, substance abuse and suicidal ideas. The patient is not nervous/anxious and does not have insomnia. Physical Exam   Constitutional: She is oriented to person, place, and time. She appears well-developed. No distress. HENT:   Mouth/Throat: Oropharynx is clear and moist.   Eyes: EOM are normal.   Neck: No thyromegaly present. Cardiovascular: Normal rate and normal heart sounds. Pulmonary/Chest: Effort normal. No respiratory distress. She has no wheezes. Abdominal: Soft. Bowel sounds are normal. There is no tenderness. Musculoskeletal: She exhibits no edema. Neurological: She is alert and oriented to person, place, and time. Skin: Skin is warm and dry. She is not diaphoretic. Psychiatric: Her behavior is normal. Thought content normal.     Lab Results   Component Value Date    TSH 2.77 04/27/2018    TSH 5.21 (H) 12/27/2017    TSH 4.12 09/15/2017    TSH 4.03 09/01/2017    TSH 3.64 07/17/2017    TSH 4.83 (H) 04/07/2017    TSH 5.01 (H) 02/01/2017           Assessment/Plan      1.  Hypothyroidism    This 65 yrs old female has h/o hypothyroidism    Etiology is hashimoto's thyroiditis. She is currently on synthroid 88 mcg daily. She is feeling more tired. Has experienced hair loss. Will repeat labs today      2 Vitamin D def. She takes vitamin D 5000 IU daily. Normal levels in 12/17 (45.4)      Results via my chart.

## 2018-11-03 ENCOUNTER — PATIENT MESSAGE (OUTPATIENT)
Dept: ENDOCRINOLOGY | Age: 58
End: 2018-11-03

## 2018-11-05 ENCOUNTER — OFFICE VISIT (OUTPATIENT)
Dept: ENT CLINIC | Age: 58
End: 2018-11-05
Payer: COMMERCIAL

## 2018-11-05 VITALS
BODY MASS INDEX: 38.65 KG/M2 | HEIGHT: 65 IN | DIASTOLIC BLOOD PRESSURE: 74 MMHG | SYSTOLIC BLOOD PRESSURE: 127 MMHG | TEMPERATURE: 98.6 F | HEART RATE: 66 BPM | WEIGHT: 232 LBS

## 2018-11-05 DIAGNOSIS — K14.8 MASS OF TONGUE: Primary | ICD-10-CM

## 2018-11-05 PROCEDURE — 99243 OFF/OP CNSLTJ NEW/EST LOW 30: CPT | Performed by: OTOLARYNGOLOGY

## 2018-11-05 RX ORDER — LEVOTHYROXINE SODIUM 88 MCG
88 TABLET ORAL DAILY
Qty: 30 TABLET | Refills: 5 | Status: SHIPPED | OUTPATIENT
Start: 2018-11-05 | End: 2019-05-21 | Stop reason: SDUPTHER

## 2018-12-28 ENCOUNTER — OFFICE VISIT (OUTPATIENT)
Dept: FAMILY MEDICINE CLINIC | Age: 58
End: 2018-12-28
Payer: COMMERCIAL

## 2018-12-28 VITALS
WEIGHT: 238.4 LBS | HEIGHT: 64 IN | OXYGEN SATURATION: 95 % | SYSTOLIC BLOOD PRESSURE: 125 MMHG | HEART RATE: 62 BPM | DIASTOLIC BLOOD PRESSURE: 82 MMHG | BODY MASS INDEX: 40.7 KG/M2

## 2018-12-28 DIAGNOSIS — M62.838 MASSETER MUSCLE SPASM: Primary | ICD-10-CM

## 2018-12-28 PROCEDURE — 99213 OFFICE O/P EST LOW 20 MIN: CPT | Performed by: PHYSICIAN ASSISTANT

## 2018-12-28 RX ORDER — IBUPROFEN 800 MG/1
800 TABLET ORAL EVERY 6 HOURS PRN
Qty: 60 TABLET | Refills: 0 | Status: SHIPPED | OUTPATIENT
Start: 2018-12-28 | End: 2019-05-03 | Stop reason: ALTCHOICE

## 2018-12-28 RX ORDER — CYCLOBENZAPRINE HCL 5 MG
5 TABLET ORAL 3 TIMES DAILY PRN
Qty: 30 TABLET | Refills: 0 | Status: SHIPPED | OUTPATIENT
Start: 2018-12-28 | End: 2018-12-31 | Stop reason: DRUGHIGH

## 2018-12-28 ASSESSMENT — ENCOUNTER SYMPTOMS
VOMITING: 0
DIARRHEA: 0
SHORTNESS OF BREATH: 0
SORE THROAT: 0
NAUSEA: 0
COUGH: 0
ABDOMINAL PAIN: 0
CONSTIPATION: 0
RHINORRHEA: 0

## 2018-12-28 NOTE — PROGRESS NOTES
Height: 5' 4\" (1.626 m)     Estimated body mass index is 40.92 kg/m² as calculated from the following:    Height as of this encounter: 5' 4\" (1.626 m). Weight as of this encounter: 238 lb 6.4 oz (108.1 kg). Physical Exam   Constitutional: She is oriented to person, place, and time. She appears well-developed and well-nourished. No distress. HENT:   Head: Normocephalic and atraumatic. No tenderness with palpation of gums/teeth. Eyes: Pupils are equal, round, and reactive to light. Conjunctivae and EOM are normal.   Neck: Neck supple. Cardiovascular: Normal rate, regular rhythm and normal heart sounds. No murmur heard. Pulmonary/Chest: Effort normal and breath sounds normal. She has no wheezes. Abdominal: Soft. Bowel sounds are normal. There is no tenderness. Musculoskeletal: She exhibits no edema. Tenderness with palpation of mastication muscles. No clicking/popping, and normal ROM of TMJ. Lymphadenopathy:     She has no cervical adenopathy. Neurological: She is alert and oriented to person, place, and time. She has normal reflexes. Skin: Skin is warm and dry. No rash noted. Psychiatric: She has a normal mood and affect. ASSESSMENT and PLAN:  Ana Maria was seen today for jaw pain. Diagnoses and all orders for this visit:    Masseter muscle spasm  -     cyclobenzaprine (FLEXERIL) 5 MG tablet; Take 1 tablet by mouth 3 times daily as needed for Muscle spasms  -     ibuprofen (ADVIL;MOTRIN) 800 MG tablet; Take 1 tablet by mouth every 6 hours as needed for Pain Take with food. - Could try generic mouthguard, available at most sporting [employer]). If it helps, recommend follow up with dentist for fitted mouthguard. Return if symptoms worsen or fail to improve.

## 2018-12-31 ENCOUNTER — PATIENT MESSAGE (OUTPATIENT)
Dept: FAMILY MEDICINE CLINIC | Age: 58
End: 2018-12-31

## 2018-12-31 ENCOUNTER — TELEPHONE (OUTPATIENT)
Dept: FAMILY MEDICINE CLINIC | Age: 58
End: 2018-12-31

## 2018-12-31 RX ORDER — CYCLOBENZAPRINE HCL 10 MG
10 TABLET ORAL 3 TIMES DAILY PRN
Qty: 30 TABLET | Refills: 0 | Status: SHIPPED | OUTPATIENT
Start: 2018-12-31 | End: 2019-05-03 | Stop reason: ALTCHOICE

## 2019-01-19 RX ORDER — CEPHALEXIN 500 MG/1
500 CAPSULE ORAL 2 TIMES DAILY
Qty: 20 CAPSULE | Refills: 0 | Status: SHIPPED | OUTPATIENT
Start: 2019-01-19 | End: 2019-05-03 | Stop reason: ALTCHOICE

## 2019-01-19 RX ORDER — METHYLPREDNISOLONE 4 MG/1
TABLET ORAL
Qty: 1 KIT | Refills: 0 | Status: SHIPPED | OUTPATIENT
Start: 2019-01-19 | End: 2019-01-24 | Stop reason: ALTCHOICE

## 2019-01-24 ENCOUNTER — OFFICE VISIT (OUTPATIENT)
Dept: FAMILY MEDICINE CLINIC | Age: 59
End: 2019-01-24
Payer: COMMERCIAL

## 2019-01-24 VITALS
HEART RATE: 64 BPM | OXYGEN SATURATION: 97 % | DIASTOLIC BLOOD PRESSURE: 80 MMHG | BODY MASS INDEX: 39.78 KG/M2 | WEIGHT: 233 LBS | HEIGHT: 64 IN | SYSTOLIC BLOOD PRESSURE: 144 MMHG

## 2019-01-24 DIAGNOSIS — R68.84 JAW PAIN: Primary | ICD-10-CM

## 2019-01-24 PROCEDURE — 99213 OFFICE O/P EST LOW 20 MIN: CPT | Performed by: NURSE PRACTITIONER

## 2019-01-24 PROCEDURE — 96372 THER/PROPH/DIAG INJ SC/IM: CPT | Performed by: NURSE PRACTITIONER

## 2019-01-24 RX ORDER — METHYLPREDNISOLONE ACETATE 40 MG/ML
40 INJECTION, SUSPENSION INTRA-ARTICULAR; INTRALESIONAL; INTRAMUSCULAR; SOFT TISSUE ONCE
Status: COMPLETED | OUTPATIENT
Start: 2019-01-24 | End: 2019-01-24

## 2019-01-24 RX ORDER — METHYLPREDNISOLONE 4 MG/1
TABLET ORAL
Qty: 1 KIT | Refills: 0 | Status: SHIPPED | OUTPATIENT
Start: 2019-01-24 | End: 2019-02-07

## 2019-01-24 RX ORDER — METHOCARBAMOL 500 MG/1
500 TABLET, FILM COATED ORAL 4 TIMES DAILY
Qty: 40 TABLET | Refills: 0 | Status: SHIPPED | OUTPATIENT
Start: 2019-01-24 | End: 2019-02-20 | Stop reason: SDUPTHER

## 2019-01-24 RX ADMIN — METHYLPREDNISOLONE ACETATE 40 MG: 40 INJECTION, SUSPENSION INTRA-ARTICULAR; INTRALESIONAL; INTRAMUSCULAR; SOFT TISSUE at 14:29

## 2019-01-24 ASSESSMENT — ENCOUNTER SYMPTOMS
NAUSEA: 0
COUGH: 0
VOMITING: 0
SHORTNESS OF BREATH: 0
RHINORRHEA: 0
DIARRHEA: 0

## 2019-01-30 ENCOUNTER — TELEPHONE (OUTPATIENT)
Dept: FAMILY MEDICINE CLINIC | Age: 59
End: 2019-01-30

## 2019-02-07 DIAGNOSIS — R68.84 JAW PAIN: ICD-10-CM

## 2019-02-07 RX ORDER — DICLOFENAC SODIUM 75 MG/1
75 TABLET, DELAYED RELEASE ORAL 2 TIMES DAILY
Qty: 60 TABLET | Refills: 0 | Status: SHIPPED | OUTPATIENT
Start: 2019-02-07 | End: 2019-02-26 | Stop reason: SDUPTHER

## 2019-02-07 RX ORDER — METHOCARBAMOL 500 MG/1
500 TABLET, FILM COATED ORAL 4 TIMES DAILY
Qty: 40 TABLET | Refills: 0 | Status: SHIPPED | OUTPATIENT
Start: 2019-02-07 | End: 2019-02-20 | Stop reason: SDUPTHER

## 2019-02-20 DIAGNOSIS — R68.84 JAW PAIN: ICD-10-CM

## 2019-02-20 RX ORDER — METHOCARBAMOL 500 MG/1
500 TABLET, FILM COATED ORAL 4 TIMES DAILY
Qty: 40 TABLET | Refills: 0 | Status: SHIPPED | OUTPATIENT
Start: 2019-02-20 | End: 2019-02-26 | Stop reason: SDUPTHER

## 2019-02-26 ENCOUNTER — OFFICE VISIT (OUTPATIENT)
Dept: FAMILY MEDICINE CLINIC | Age: 59
End: 2019-02-26
Payer: COMMERCIAL

## 2019-02-26 VITALS
BODY MASS INDEX: 39.95 KG/M2 | DIASTOLIC BLOOD PRESSURE: 84 MMHG | SYSTOLIC BLOOD PRESSURE: 126 MMHG | WEIGHT: 234 LBS | HEART RATE: 68 BPM | HEIGHT: 64 IN | OXYGEN SATURATION: 95 %

## 2019-02-26 DIAGNOSIS — R93.7 ABNORMAL X-RAY OF BONE: ICD-10-CM

## 2019-02-26 DIAGNOSIS — R68.84 JAW PAIN: ICD-10-CM

## 2019-02-26 DIAGNOSIS — S02.5XXA CLOSED FRACTURE OF TOOTH, INITIAL ENCOUNTER: Primary | ICD-10-CM

## 2019-02-26 PROCEDURE — 99214 OFFICE O/P EST MOD 30 MIN: CPT | Performed by: FAMILY MEDICINE

## 2019-02-26 RX ORDER — DICLOFENAC SODIUM 75 MG/1
75 TABLET, DELAYED RELEASE ORAL 2 TIMES DAILY PRN
Qty: 60 TABLET | Refills: 0 | Status: SHIPPED | OUTPATIENT
Start: 2019-02-26 | End: 2019-05-03 | Stop reason: ALTCHOICE

## 2019-02-26 RX ORDER — METHOCARBAMOL 500 MG/1
500 TABLET, FILM COATED ORAL 2 TIMES DAILY PRN
Qty: 60 TABLET | Refills: 0 | Status: SHIPPED | OUTPATIENT
Start: 2019-02-26 | End: 2019-04-02 | Stop reason: SDUPTHER

## 2019-02-26 ASSESSMENT — PATIENT HEALTH QUESTIONNAIRE - PHQ9
2. FEELING DOWN, DEPRESSED OR HOPELESS: 0
SUM OF ALL RESPONSES TO PHQ9 QUESTIONS 1 & 2: 0
1. LITTLE INTEREST OR PLEASURE IN DOING THINGS: 0
SUM OF ALL RESPONSES TO PHQ QUESTIONS 1-9: 0
SUM OF ALL RESPONSES TO PHQ QUESTIONS 1-9: 0

## 2019-02-26 ASSESSMENT — ENCOUNTER SYMPTOMS
NAUSEA: 0
DIARRHEA: 0
SHORTNESS OF BREATH: 0
VOMITING: 0
EYE REDNESS: 0
CONSTIPATION: 0

## 2019-04-01 DIAGNOSIS — R68.84 JAW PAIN: ICD-10-CM

## 2019-04-01 RX ORDER — METHOCARBAMOL 500 MG/1
500 TABLET, FILM COATED ORAL 2 TIMES DAILY PRN
Qty: 60 TABLET | Refills: 0 | OUTPATIENT
Start: 2019-04-01

## 2019-05-03 ENCOUNTER — HOSPITAL ENCOUNTER (OUTPATIENT)
Age: 59
Discharge: HOME | End: 2019-05-03
Payer: COMMERCIAL

## 2019-05-03 ENCOUNTER — OFFICE VISIT (OUTPATIENT)
Dept: ENDOCRINOLOGY | Age: 59
End: 2019-05-03
Payer: COMMERCIAL

## 2019-05-03 VITALS
BODY MASS INDEX: 39.62 KG/M2 | OXYGEN SATURATION: 96 % | WEIGHT: 230.8 LBS | SYSTOLIC BLOOD PRESSURE: 126 MMHG | HEART RATE: 65 BPM | DIASTOLIC BLOOD PRESSURE: 72 MMHG

## 2019-05-03 DIAGNOSIS — E03.9 ACQUIRED HYPOTHYROIDISM: ICD-10-CM

## 2019-05-03 DIAGNOSIS — E03.9 ACQUIRED HYPOTHYROIDISM: Primary | ICD-10-CM

## 2019-05-03 LAB
T4 FREE: 1.7 NG/DL (ref 0.9–1.8)
TSH SERPL DL<=0.05 MIU/L-ACNC: 2.38 UIU/ML (ref 0.27–4.2)

## 2019-05-03 PROCEDURE — 84439 ASSAY OF FREE THYROXINE: CPT

## 2019-05-03 PROCEDURE — 36415 COLL VENOUS BLD VENIPUNCTURE: CPT

## 2019-05-03 PROCEDURE — 99213 OFFICE O/P EST LOW 20 MIN: CPT | Performed by: INTERNAL MEDICINE

## 2019-05-03 PROCEDURE — 84443 ASSAY THYROID STIM HORMONE: CPT

## 2019-05-03 ASSESSMENT — ENCOUNTER SYMPTOMS
BACK PAIN: 0
COUGH: 0
HEMOPTYSIS: 0
BLURRED VISION: 0
PHOTOPHOBIA: 0
ORTHOPNEA: 0
DOUBLE VISION: 0

## 2019-05-03 NOTE — PROGRESS NOTES
Endocrinology    Miranda Douglass M.D. Phone: [phone]   FAX: [phone]       [address]   YOB: 1960    Date of Visit:  5/3/2019    No Known Allergies  Outpatient Medications Marked as Taking for the 5/3/19 encounter (Office Visit) with Georges Peralta MD   Medication Sig Dispense Refill    SYNTHROID 88 MCG tablet Take 1 tablet by mouth Daily 30 tablet 5         Vitals:    19 1340   BP: 126/72   Pulse: 65   SpO2: 96%   Weight: 230 lb 12.8 oz (104.7 kg)     Body mass index is 39.62 kg/m². Wt Readings from Last 3 Encounters:   19 230 lb 12.8 oz (104.7 kg)   19 234 lb (106.1 kg)   19 233 lb (105.7 kg)     BP Readings from Last 3 Encounters:   19 126/72   19 126/84   19 (!) 144/80        Past Medical History:   Diagnosis Date    Acquired hypothyroidism 2017    Hyperthyroidism     Morbid obesity ([facility] 2017     Past Surgical History:   Procedure Laterality Date    BACK SURGERY       Family History   Problem Relation Age of Onset    Heart Disease Mother      Social History     Tobacco Use   Smoking Status Former Smoker    Packs/day: 0.50    Years: 20.00    Pack years: 10.00    Types: Cigarettes    Last attempt to quit: 2010    Years since quittin.2   Smokeless Tobacco Never Used      Social History     Substance and Sexual Activity   Alcohol Use No       HPI      Lauren Bailey is a 63 y.o. female who is here for a follow-up for management  of thyroid disease  Elver Lopez DO    Diagnosed with Hypothyroidism in 2016  Current thyroid medication: name brand Synthroid- 88  mcg daily  Takes it first thing in the morning on empty stomach- yes  Interfering medications including calcium, vitamin D , iron tablets, Proton pump inhibitors: no    FH of hypothyroidism: no  FH of thyroid cancer: no    Has lost some weight. Also having hair loss. Review of Systems   Constitutional: Positive for malaise/fatigue. Negative for chills, diaphoresis, fever and weight loss. Eyes: Negative for blurred vision, double vision and photophobia. Respiratory: Negative for cough and hemoptysis. Cardiovascular: Negative for chest pain, palpitations and orthopnea. Genitourinary: Negative for dysuria, flank pain, frequency, hematuria and urgency. Musculoskeletal: Positive for myalgias. Negative for back pain, falls, joint pain and neck pain. Skin: Negative for itching and rash. Neurological: Positive for headaches. Negative for dizziness, tingling, tremors, sensory change, speech change, focal weakness, seizures and loss of consciousness. Endo/Heme/Allergies: Negative for environmental allergies and polydipsia. Does not bruise/bleed easily. Psychiatric/Behavioral: Positive for depression. Negative for hallucinations, memory loss, substance abuse and suicidal ideas. The patient is not nervous/anxious and does not have insomnia. Physical Exam   Constitutional: She is oriented to person, place, and time. She appears well-developed. No distress. HENT:   Mouth/Throat: Oropharynx is clear and moist.   Eyes: EOM are normal.   Neck: No thyromegaly present. Cardiovascular: Normal rate and normal heart sounds. Pulmonary/Chest: Effort normal. No respiratory distress. She has no wheezes. Abdominal: Soft. Bowel sounds are normal. There is no tenderness. Musculoskeletal: She exhibits no edema. Neurological: She is alert and oriented to person, place, and time. Skin: Skin is warm and dry. She is not diaphoretic. Psychiatric: Her behavior is normal. Thought content normal.     Lab Results   Component Value Date    TSH 2.80 11/02/2018    TSH 2.77 04/27/2018    TSH 5.21 (H) 12/27/2017    TSH 4.12 09/15/2017    TSH 4.03 09/01/2017    TSH 3.64 07/17/2017    TSH 4.83 (H) 04/07/2017    TSH 5.01 (H) 02/01/2017           Assessment/Plan      1.  Hypothyroidism    This 63 yrs old female has h/o hypothyroidism    Etiology is

## 2019-05-21 RX ORDER — LEVOTHYROXINE SODIUM 88 MCG
TABLET ORAL
Qty: 30 TABLET | Refills: 4 | Status: SHIPPED | OUTPATIENT
Start: 2019-05-21 | End: 2019-10-18 | Stop reason: SDUPTHER

## 2019-06-20 ENCOUNTER — OFFICE VISIT (OUTPATIENT)
Dept: FAMILY MEDICINE CLINIC | Age: 59
End: 2019-06-20
Payer: COMMERCIAL

## 2019-06-20 VITALS
DIASTOLIC BLOOD PRESSURE: 70 MMHG | BODY MASS INDEX: 38.93 KG/M2 | WEIGHT: 228 LBS | HEIGHT: 64 IN | OXYGEN SATURATION: 94 % | SYSTOLIC BLOOD PRESSURE: 124 MMHG | HEART RATE: 71 BPM

## 2019-06-20 DIAGNOSIS — T14.8XXA SPLINTER: Primary | ICD-10-CM

## 2019-06-20 PROCEDURE — 99213 OFFICE O/P EST LOW 20 MIN: CPT | Performed by: NURSE PRACTITIONER

## 2019-06-20 ASSESSMENT — PATIENT HEALTH QUESTIONNAIRE - PHQ9
SUM OF ALL RESPONSES TO PHQ QUESTIONS 1-9: 0
SUM OF ALL RESPONSES TO PHQ QUESTIONS 1-9: 0
SUM OF ALL RESPONSES TO PHQ9 QUESTIONS 1 & 2: 0
2. FEELING DOWN, DEPRESSED OR HOPELESS: 0
1. LITTLE INTEREST OR PLEASURE IN DOING THINGS: 0

## 2019-06-20 NOTE — PROGRESS NOTES
2019    Chief Complaint   Patient presents with    Foreign Body in Skin     in LT hand some redness      HPI: Nae Wade is a 63 y.o. female who presents with complaints of foreign bogy in the left hand. Patient states she was picking up a frame and a splinter of wood got stuck in her hand at the base of the 5th digit, this occurred 6 weeks ago. She was able to pull out a piece of the splinter but the wood is very thin and could not get all of it out. She went to urgent care and they used a beveled needle and removed another piece of the splinter and thought they had gotten the entire thing out however that was several weeks ago and this area continues to be raised and painful. There is no drainage, erythema, denies fever. Past Medical History:   Diagnosis Date    Acquired hypothyroidism 2017    Hyperthyroidism     Morbid obesity ([facility] [address] 2017     Past Surgical History:   Procedure Laterality Date    BACK SURGERY       Social History     Tobacco Use    Smoking status: Former Smoker     Packs/day: 0.50     Years: 20.00     Pack years: 10.00     Types: Cigarettes     Last attempt to quit: 2010     Years since quittin.3    Smokeless tobacco: Never Used   Substance Use Topics    Alcohol use: No    Drug use: No       Family History   Problem Relation Age of Onset    Heart Disease Mother        Review of Systems   Constitutional: Negative for diaphoresis, fatigue and fever. Musculoskeletal: Positive for arthralgias. Negative for gait problem. Skin: Negative for color change, pallor, rash and wound. Physical Exam   Constitutional: She is oriented to person, place, and time. She appears well-developed and well-nourished. No distress. HENT:   Head: Normocephalic and atraumatic. Pulmonary/Chest: Effort normal. No respiratory distress. Neurological: She is alert and oriented to person, place, and time. No cranial nerve deficit. Skin: Skin is warm and dry.  She is not

## 2019-06-21 ASSESSMENT — ENCOUNTER SYMPTOMS: COLOR CHANGE: 0

## 2019-07-12 ENCOUNTER — OFFICE VISIT (OUTPATIENT)
Dept: FAMILY MEDICINE CLINIC | Age: 59
End: 2019-07-12
Payer: COMMERCIAL

## 2019-07-12 VITALS
HEART RATE: 67 BPM | HEIGHT: 64 IN | OXYGEN SATURATION: 96 % | SYSTOLIC BLOOD PRESSURE: 112 MMHG | BODY MASS INDEX: 38.93 KG/M2 | WEIGHT: 228 LBS | DIASTOLIC BLOOD PRESSURE: 86 MMHG

## 2019-07-12 DIAGNOSIS — S60.459D FOREIGN BODY IN SKIN OF FINGER, SUBSEQUENT ENCOUNTER: Primary | ICD-10-CM

## 2019-07-12 PROCEDURE — 99213 OFFICE O/P EST LOW 20 MIN: CPT | Performed by: FAMILY MEDICINE

## 2019-07-12 RX ORDER — CEPHALEXIN 500 MG/1
500 CAPSULE ORAL 2 TIMES DAILY
Qty: 14 CAPSULE | Refills: 0 | Status: SHIPPED | OUTPATIENT
Start: 2019-07-12 | End: 2019-07-16

## 2019-07-12 ASSESSMENT — ENCOUNTER SYMPTOMS: COLOR CHANGE: 1

## 2019-07-16 ENCOUNTER — OFFICE VISIT (OUTPATIENT)
Dept: ORTHOPEDIC SURGERY | Age: 59
End: 2019-07-16
Payer: COMMERCIAL

## 2019-07-16 VITALS — WEIGHT: 227 LBS | RESPIRATION RATE: 16 BRPM | BODY MASS INDEX: 37.82 KG/M2 | HEIGHT: 65 IN

## 2019-07-16 DIAGNOSIS — S60.552A FOREIGN BODY, HAND, SUPERFICIAL, LEFT, INITIAL ENCOUNTER: Primary | ICD-10-CM

## 2019-07-16 PROCEDURE — 99243 OFF/OP CNSLTJ NEW/EST LOW 30: CPT | Performed by: ORTHOPAEDIC SURGERY

## 2019-10-18 DIAGNOSIS — E03.9 ACQUIRED HYPOTHYROIDISM: Primary | ICD-10-CM

## 2019-10-21 RX ORDER — LEVOTHYROXINE SODIUM 88 MCG
88 TABLET ORAL DAILY
Qty: 30 TABLET | Refills: 4 | Status: SHIPPED | OUTPATIENT
Start: 2019-10-21 | End: 2020-04-20

## 2019-11-06 ENCOUNTER — OFFICE VISIT (OUTPATIENT)
Dept: ENDOCRINOLOGY | Age: 59
End: 2019-11-06
Payer: COMMERCIAL

## 2019-11-06 VITALS
RESPIRATION RATE: 18 BRPM | BODY MASS INDEX: 37.15 KG/M2 | DIASTOLIC BLOOD PRESSURE: 78 MMHG | HEART RATE: 67 BPM | HEIGHT: 65 IN | OXYGEN SATURATION: 95 % | SYSTOLIC BLOOD PRESSURE: 132 MMHG | WEIGHT: 223 LBS

## 2019-11-06 DIAGNOSIS — E55.9 VITAMIN D DEFICIENCY: ICD-10-CM

## 2019-11-06 DIAGNOSIS — E03.8 HYPOTHYROIDISM DUE TO HASHIMOTO'S THYROIDITIS: Primary | ICD-10-CM

## 2019-11-06 DIAGNOSIS — E66.01 MORBID OBESITY (HCC): ICD-10-CM

## 2019-11-06 DIAGNOSIS — E06.3 HYPOTHYROIDISM DUE TO HASHIMOTO'S THYROIDITIS: Primary | ICD-10-CM

## 2019-11-06 DIAGNOSIS — Z78.0 POSTMENOPAUSAL: ICD-10-CM

## 2019-11-06 PROCEDURE — 99214 OFFICE O/P EST MOD 30 MIN: CPT | Performed by: NURSE PRACTITIONER

## 2019-11-06 ASSESSMENT — ENCOUNTER SYMPTOMS
ABDOMINAL PAIN: 0
DIARRHEA: 0
EYE PAIN: 0
CONSTIPATION: 0
COLOR CHANGE: 0
SHORTNESS OF BREATH: 0
BACK PAIN: 0

## 2020-03-03 ENCOUNTER — OFFICE VISIT (OUTPATIENT)
Dept: FAMILY MEDICINE CLINIC | Age: 60
End: 2020-03-03
Payer: COMMERCIAL

## 2020-03-03 VITALS
HEIGHT: 65 IN | HEART RATE: 67 BPM | WEIGHT: 221 LBS | OXYGEN SATURATION: 98 % | DIASTOLIC BLOOD PRESSURE: 82 MMHG | BODY MASS INDEX: 36.82 KG/M2 | SYSTOLIC BLOOD PRESSURE: 120 MMHG

## 2020-03-03 PROCEDURE — 99214 OFFICE O/P EST MOD 30 MIN: CPT | Performed by: FAMILY MEDICINE

## 2020-03-03 ASSESSMENT — ENCOUNTER SYMPTOMS
EYE REDNESS: 0
DIARRHEA: 0
SHORTNESS OF BREATH: 0
CONSTIPATION: 0
NAUSEA: 1
VOMITING: 0

## 2020-03-03 ASSESSMENT — PATIENT HEALTH QUESTIONNAIRE - PHQ9
SUM OF ALL RESPONSES TO PHQ QUESTIONS 1-9: 0
1. LITTLE INTEREST OR PLEASURE IN DOING THINGS: 0
SUM OF ALL RESPONSES TO PHQ9 QUESTIONS 1 & 2: 0
SUM OF ALL RESPONSES TO PHQ QUESTIONS 1-9: 0
2. FEELING DOWN, DEPRESSED OR HOPELESS: 0

## 2020-03-03 NOTE — PROGRESS NOTES
 Financial resource strain: Not on file   [place] insecurity:     Worry: Not on file     Inability: Not on file    Transportation needs:     Medical: Not on file     Non-medical: Not on file   Tobacco Use    Smoking status: Former Smoker     Packs/day: 0.50     Years: 20.00     Pack years: 10.00     Types: Cigarettes     Last attempt to quit: 2/1/2010     Years since quitting: 10.0    Smokeless tobacco: Never Used   Substance and Sexual Activity    Alcohol use: No    Drug use: No    Sexual activity: Yes   Lifestyle    Physical activity:     Days per week: Not on file     Minutes per session: Not on file    Stress: Not on file   Relationships    Social connections:     Talks on phone: Not on file     Gets together: Not on file     Attends [employer] service: Not on file     Active member of club or organization: Not on file     Attends meetings of clubs or organizations: Not on file     Relationship status: Not on file    Intimate partner violence:     Fear of current or ex partner: Not on file     Emotionally abused: Not on file     Physically abused: Not on file     Forced sexual activity: Not on file   Other Topics Concern    Not on file   Social History Narrative    Not on file           Review of Systems   Constitutional: Positive for weight loss. Negative for unexpected weight change. HENT: Negative. Eyes: Negative for redness. Respiratory: Negative for shortness of breath. Cardiovascular: Negative for chest pain and leg swelling. Gastrointestinal: Positive for nausea. Negative for constipation, diarrhea and vomiting. Musculoskeletal: Negative for arthralgias. Skin: Negative for pallor. Allergic/Immunologic: Negative for immunocompromised state. Psychiatric/Behavioral: Negative for confusion. All other systems reviewed and are negative.     Current Outpatient Medications   Medication Sig Dispense Refill    SYNTHROID 88 MCG tablet Take 1 tablet by mouth Daily 30 tablet 4     No

## 2020-04-20 RX ORDER — LEVOTHYROXINE SODIUM 88 MCG
TABLET ORAL
Qty: 30 TABLET | Refills: 3 | Status: SHIPPED | OUTPATIENT
Start: 2020-04-20 | End: 2020-09-08

## 2020-05-06 ENCOUNTER — VIRTUAL VISIT (OUTPATIENT)
Dept: ENDOCRINOLOGY | Age: 60
End: 2020-05-06
Payer: COMMERCIAL

## 2020-05-06 PROCEDURE — 99213 OFFICE O/P EST LOW 20 MIN: CPT | Performed by: INTERNAL MEDICINE

## 2020-05-06 ASSESSMENT — ENCOUNTER SYMPTOMS
COUGH: 0
BACK PAIN: 0
DOUBLE VISION: 0
PHOTOPHOBIA: 0
BLURRED VISION: 0
ORTHOPNEA: 0
HEMOPTYSIS: 0

## 2020-05-06 NOTE — PROGRESS NOTES
Endocrinology    Karen Crane M.D. Phone: [phone]   FAX: [phone]       [address]   YOB: 1960    Date of Visit:  5/6/2020    No Known Allergies  Outpatient Medications Marked as Taking for the 5/6/20 encounter (Virtual Visit) with She Romero MD   Medication Sig Dispense Refill    SYNTHROID 88 MCG tablet TAKE ONE TABLET BY MOUTH DAILY 30 tablet 3         There were no vitals filed for this visit. There is no height or weight on file to calculate BMI. Wt Readings from Last 3 Encounters:   03/03/20 221 lb (100.2 kg)   11/06/19 223 lb (101.2 kg)   07/16/19 227 lb (103 kg)     BP Readings from Last 3 Encounters:   03/03/20 120/82   11/06/19 132/78   07/12/19 112/86        Past Medical History:   Diagnosis Date    Acquired hypothyroidism 2/1/2017    Foreign body, hand, superficial, left, initial encounter 7/16/2019    Hyperthyroidism     Morbid obesity ([address] 2/1/2017     Past Surgical History:   Procedure Laterality Date    BACK SURGERY  2010     Family History   Problem Relation Age of Onset    Heart Disease Mother      Social History     Tobacco Use   Smoking Status Former Smoker    Packs/day: 0.50    Years: 20.00    Pack years: 10.00    Types: Cigarettes    Last attempt to quit: 2/1/2010    Years since quitting: 10.2   Smokeless Tobacco Never Used      Social History     Substance and Sexual Activity   Alcohol Use No       HPI      Cher Seay is a 63 y.o. female who was seen in a virtual visit for management  of thyroid disease  Bartholomew Gosselin, DO     Due to the COVID-19 restrictions on close contact interactions the patient's visit was conducted via video link  ( [email or web address]. me) in lieu of a face to face visit.        Location for patient : home  Physician : home    Pursuant to the emergency declaration under the [facility], [address] [place] [address] [facility] authority and the [facility] [employer],

## 2020-09-08 RX ORDER — LEVOTHYROXINE SODIUM 88 MCG
TABLET ORAL
Qty: 30 TABLET | Refills: 2 | Status: SHIPPED | OUTPATIENT
Start: 2020-09-08 | End: 2020-12-04 | Stop reason: SDUPTHER

## 2020-12-04 RX ORDER — LEVOTHYROXINE SODIUM 88 MCG
88 TABLET ORAL DAILY
Qty: 30 TABLET | Refills: 2 | Status: SHIPPED | OUTPATIENT
Start: 2020-12-04 | Stop reason: SDUPTHER

## 2020-12-04 NOTE — TELEPHONE ENCOUNTER
LOV: 5/6/2020    NOV: NONE     DOSE: 88 mcg     Frequency: Daily     Pharmacy as Pended:     Per LOV Note: She is currently on synthroid 88 mcg daily.      Per Last PHONE NOTE:         Lab Results   Component Value Date    TSH 2.38 05/03/2019    FT3 3.3 02/01/2017    T4FREE 1.7 05/03/2019

## 2020-12-08 ENCOUNTER — VIRTUAL VISIT (OUTPATIENT)
Dept: FAMILY MEDICINE CLINIC | Age: 60
End: 2020-12-08
Payer: COMMERCIAL

## 2020-12-08 PROCEDURE — 99213 OFFICE O/P EST LOW 20 MIN: CPT | Performed by: FAMILY MEDICINE

## 2020-12-08 NOTE — PROGRESS NOTES
TELEHEALTH EVALUATION -- Audio/Visual (During FTFIV-19 public health emergency)    HPI:  The patient has requested an audio/video evaluation for the following concern(s):  Chief Complaint   Patient presents with    Cyst     left hand / started about 1 month ago/ not painful      Cyst.  This is new. It is in the palmar surface of her hand. Present for about 4 weeks, getting new cysts adjacent to it and now has 3 total.   It hurts when she drives, in other words, when she has her hand on the steering wheel or if she has been doing a lot of typing. She has seen Dr. Barry Ng in the past for a foreign body in her finger so I do recommend she follow-up with him again I do not recommend any topical treatments because I do not think that they would be effective.     Past Medical History:   Diagnosis Date    Acquired hypothyroidism 2/1/2017    Foreign body, hand, superficial, left, initial encounter 7/16/2019    Hyperthyroidism     Morbid obesity ([facility] [address] 2/1/2017     Social History     Socioeconomic History    Marital status:      Spouse name: Not on file    Number of children: Not on file    Years of education: Not on file    Highest education level: Not on file   Occupational History    Not on file   Social Needs    Financial resource strain: Not on file    Food insecurity     Worry: Not on file     Inability: Not on file    Transportation needs     Medical: Not on file     Non-medical: Not on file   Tobacco Use    Smoking status: Former Smoker     Packs/day: 0.50     Years: 20.00     Pack years: 10.00     Types: Cigarettes     Last attempt to quit: 2/1/2010     Years since quitting: 10.8    Smokeless tobacco: Never Used   Substance and Sexual Activity    Alcohol use: No    Drug use: No    Sexual activity: Yes   Lifestyle    Physical activity     Days per week: Not on file     Minutes per session: Not on file    Stress: Not on file   Relationships    Social connections     Talks on phone: Not on file     Gets together: Not on file     Attends Oriental orthodox service: Not on file     Active member of club or organization: Not on file     Attends meetings of clubs or organizations: Not on file     Relationship status: Not on file    Intimate partner violence     Fear of current or ex partner: Not on file     Emotionally abused: Not on file     Physically abused: Not on file     Forced sexual activity: Not on file   Other Topics Concern    Not on file   Social History Narrative    Not on file     Current Outpatient Medications on File Prior to Visit   Medication Sig Dispense Refill    SYNTHROID 88 MCG tablet Take 1 tablet by mouth Daily 30 tablet 2     No current facility-administered medications on file prior to visit. Review of Systems   Constitutional: Negative. HENT: Negative. Eyes: Negative for blurred vision, discharge and redness. Respiratory: Negative for chest tightness and shortness of breath. Cardiovascular: Negative for chest pain and palpitations. Gastrointestinal: Negative. Endocrine: Negative. Genitourinary: Negative. Musculoskeletal: Negative. Allergic/Immunologic: Negative for immunocompromised state. Neurological: Negative for dizziness. Psychiatric/Behavioral: Negative for agitation, behavioral problems and confusion. All other systems reviewed and are negative. .      Constitutional: [x] Appears well-developed and well-nourished [x] No apparent distress      [] Abnormal-   Mental status  [x] Alert and awake  [x] Oriented to person/place/time [x]Able to follow commands      Eyes:  EOM    [x]  Normal  [] Abnormal-  Sclera  [x]  Normal  [] Abnormal -         Discharge [x]  None visible  [] Abnormal -    HENT:   [x] Normocephalic, atraumatic.   [] Abnormal   [] Mouth/Throat: Mucous membranes are moist.     External Ears [x] Normal  [] Abnormal-     Neck: [x] No visualized mass     Pulmonary/Chest: [x] Respiratory effort normal.  [x] No visualized signs of difficulty breathing or respiratory distress        [] Abnormal-    Able to speak in full sentences without difficulty  Musculoskeletal:   [] Normal gait with no signs of ataxia         [x] Normal range of motion of neck        [x] Abnormal- nodules noted on morgan surface left hand    Neurological:        [x] No Facial Asymmetry (Cranial nerve 7 motor function) (limited exam to video visit)          [x] No gaze palsy        [] Abnormal-         Skin:        [x] No significant exanthematous lesions or discoloration noted on facial skin         [] Abnormal-            Psychiatric:       [x] Normal Affect [] No Hallucinations        [] Abnormal-   Judgment, behavior, thought and mood are normal.     (During VWWLN-04 public health emergency), evaluation of the following organ systems was limited: Vitals/Constitutional/EENT/Resp/CV/GI//MS/Neuro/Skin/Heme-Lymph-Imm. Pursuant to the emergency declaration under the [address] [place] [address] and the [employer] and [employer] Act, this Virtual Visit was conducted with patient's (and/or legal guardian's) consent, to reduce the patient's risk of exposure to COVID-19 and provide necessary medical care. The patient (and/or legal guardian) has also been advised to contact this office for worsening conditions or problems, and seek emergency medical treatment and/or call 911 if deemed necessary. Patient initiated the encounter and gave consent for the encounter. Services were provided through a video synchronous discussion virtually to substitute for in-person clinic visit. Patient and provider were located at their individual homes. Diagnosis Orders   1.  Ganglion cyst of tendon sheath of left hand  Rosa Cooley MD, Hand Surgery (Hand, Wrist, Upper Extremity), [facility]     Ana Maria was seen today for cyst.    Diagnoses and all orders for this visit:    Ganglion cyst of tendon sheath of left hand  -     Sanam Sethi MD, Hand Surgery (Hand, Wrist, Upper Extremity), [facility]

## 2021-01-20 ENCOUNTER — OFFICE VISIT (OUTPATIENT)
Dept: ORTHOPEDIC SURGERY | Age: 61
End: 2021-01-20
Payer: COMMERCIAL

## 2021-01-20 VITALS — WEIGHT: 220 LBS | RESPIRATION RATE: 16 BRPM | BODY MASS INDEX: 36.65 KG/M2 | TEMPERATURE: 97.9 F | HEIGHT: 65 IN

## 2021-01-20 DIAGNOSIS — M65.30 TRIGGER FINGER, ACQUIRED: Primary | ICD-10-CM

## 2021-01-20 DIAGNOSIS — M72.0 DUPUYTREN'S CONTRACTURE: ICD-10-CM

## 2021-01-20 PROCEDURE — 99243 OFF/OP CNSLTJ NEW/EST LOW 30: CPT | Performed by: ORTHOPAEDIC SURGERY

## 2021-01-20 NOTE — PROGRESS NOTES
Ms. Lizabeth Archer is a 62 y.o. right handed   who is seen today in Hand Surgical Consultation at the request of Dawna Espino DO. She presents today regarding left symptoms which have been present for approximately 6 weeks. A history of antecedent trauma or injury is Absent. She reports symptoms to include mild pain and no stiffness with no popping, catching or locking of the left Ring Finger. Finger symptoms are occasionally worse in the morning or overnight. She reports no pain located at the base of the symptomatic finger(s). Symptoms show no change over time. She also indicates a palmar mass in the axis of the Ring Finger with which she associates her discomfort    Previous treatment has included no prior treatments used. She does not claim relation of her symptoms to her required work activities. She has not undergone any form of testing. I have today reviewed with Lizabeth Archer the clinically relevant, past medical history, medications, allergies,  family history, social history, and Review Of Systems & I have documented any details relevant to today's presenting complaints in my history above. Ms. Samantha Baker self-reported past medical history, medications, allergies,  family history, social history, and Review Of Systems have been scanned into the chart under the \"Media\" tab. Physical Exam:  Ms. Samantha Taylor's most recent vitals:  Vitals  Temp: 97.9 °F (36.6 °C)  Temp Source: Infrared  Resp: 16  Height: 5' 5\" (165.1 cm)  Weight: 220 lb (99.8 kg)    She is well nourished, oriented to person, place & time. She demonstrates appropriate mood and affect as well as normal gait and station.     Skin: Normal in appearance, Normal Color, Free of Lesions and Clinical evidence of Dupuytren's Contracture in the Ring Finger Left side Digital range of motion is without significant limitation otherwise normal bilaterally. Inducible triggering is mildly noted upon flexion in the left Ring Finger. There is not an associated flexion contracture of the PIP joint. No other digit demonstrates evidence for stenosing tenosynovitis bilaterally. Wrist range of motion is Full bilaterally. Sensation is normal in the Whole Hand bilaterally  Vascular examination reveals normal and good capillary refill bilaterally  Swelling is minimal in the symptomatic digit, absent elsewhere bilaterally  There is no evidence of gross joint instability bilaterally. Muscular strength is clinically appropriate bilaterally. Examination for Stenosing Tenosynovitis demonstrates mild tenderness, thickening & nodularity at the A-1 pulley(s) of the Left Ring Finger. There is a palpable Nota's Node. There is Inducible triggering on active flexion with pain. No other digits demonstrate evidence of Stenosing Tenosynovitis. There is a solitary nodule on the palm,on the left in the axis of the Ring Finger without involvement of the Ring Finger to the level of the MCP Joint. Impression:  Ms. Aayush Aguiar is showing clinical evidence of Stenosing Tenosynovitis (Trigger Finger) with some mild overlying Dupuytren's Contracture and presents requesting further treatment.     Plan: I have had a thorough discussion with Ms. Nelson Lacy regarding the treatment options available for her initially presenting left  Ring Finger stenosing tenosynovitis, which is causing her significant symptoms and difficulty. I have outlined for Ms. Ana Maria Taylor the risk, benefits and consequences of the various treatment modalities, including a reasonable expectation for the long term success of each. We have discussed the likelihood that further, more aggressive treatment may be required for her current presenting condition. Based upon our current discussion and a reasonable understating of the options available to her, Ms. Nelson Lacy has selected to proceed with a conservative plan of treatment consisting of: activity modification, and the judicious use of over-the-counter anti-inflammatory medications if allowed by her primary care physician. Instructions were given regarding conservative treatments as well as suggestions for use of the other modalities were discussed. I have clearly explained to her that the above outlined treatment plan should not be expected to 'cure' her  stenosing tenosynovitis, but we are rather treating the symptoms with which she presents. She has understood that in order to achieve more durable relief of her symptoms and to prevent future worsening or further damage, that definitive surgical treatment would be required. Ms. Nelson Lacy  voiced an appropriate understanding of our discussion, the options available to her, and of the expectations of her selected  treatment. I have had a thorough discussion with Ms. Jennifer De La Cruz regarding the treatment options available for her new onset left  Ring Finger Dupuytren's Contracture, which is causing her significant concern and difficulty. I have outlined for Ms. Ana Maria Taylor the risk, benefits and consequences of the various treatment modalities, including a reasonable expectation for the long term success of each. We have discussed the likelihood that further more aggressive treatment may be required for her current presenting condition. Based upon our current discussion and a reasonable understating of the options available to her, Ms. Jennifer De La Cruz has selected to proceed with a conservative plan of treatment consisting of: careful observation, intermittent reassessment of the degree of contracture (Table-Top Test) and maintenance of full range of motion of the uninvolved digits. I have clearly explained to her that the above outlined treatment plan should not be expected to 'cure' her  Dupuytren's Contracture or resolve his current contractures, but we are rather treating the symptoms with which she presents. She has understood that in order to achieve more durable relief of her symptoms and to prevent future worsening or further damage, that definitive treatment, in the form of surgery or enzyme injection, would be required. Ms. Jennifer De La Cruz  voiced an appropriate understanding of our discussion, the options available to her, and of the expectations of her selected  treatment. I have also discussed with Ms. Jaswant Polanco  the other treatment options available to her  for this condition. We have today selected to proceed with conservative management. She and I have agreed that if our current course of conservative treatment does not prove to be effective over the short term future, that she will schedule a follow-up appointment to discuss and select an alternate course of therapy including possibly injection or surgical treatment. I did discuss with Ms. Jaswant Polanco today the possibility that surgical (or other) treatment in a hand which appears to have some Dupuytren's Contracture may run the risk of accellerating or stimulating the progression of her Dupuytren's Contracture and/or worsening her contracture. She voiced an understanding of this possibility and did wish to proceed. Ms. Jaswant Polanco has been given a full verbal list of instructions and precautions related to her present condition. I have asked her to followup with me in the office at the prescribed time. She is also specifically requested to call or return to the office sooner if her symptoms change or worsen prior to the next scheduled appointment.

## 2021-01-20 NOTE — PATIENT INSTRUCTIONS
Screening Evaluation for progression of Dupuytren's Contracture    Every 3 - 6 months, perform the \"TableTop Test\". (Illustrated below)    If you are able to place your hand flat on hard surface (table, counter, etc) then there is no treatment needed. If you find that you are unable to place your hand flat on hard surface, that usually indicates a 30 degree contracture of a finger. When TableTop test turns \"positive\" (can not lay hand flat anymore) this is the time to think about treatment of Dupuytren's Contracture. I recommend scheduling a follow-up appointment if/when your TableTop Test turns positive or if you think you range of motion is continuing to worsen. Call the office at [phone] or [phone] to schedule a follow-up appointment for your Dupuytren's Contracture.

## 2021-03-31 ENCOUNTER — OFFICE VISIT (OUTPATIENT)
Dept: ORTHOPEDIC SURGERY | Age: 61
End: 2021-03-31
Payer: COMMERCIAL

## 2021-03-31 VITALS — TEMPERATURE: 97.1 F | BODY MASS INDEX: 36.65 KG/M2 | WEIGHT: 220 LBS | HEIGHT: 65 IN

## 2021-03-31 DIAGNOSIS — M72.0 DUPUYTREN'S CONTRACTURE: Primary | ICD-10-CM

## 2021-03-31 PROCEDURE — 99213 OFFICE O/P EST LOW 20 MIN: CPT | Performed by: ORTHOPAEDIC SURGERY

## 2021-03-31 NOTE — PROGRESS NOTES
Ms. Krishna Lopez  returns today regarding left Ring Finger Dupuytren's Contracture for which she was last evaluated in January 2021. Treatment up to this time has included: cautious observation. She has noted the size of the palmar thickening has been stable with time. She has not noted any limitation of motion of the Ring Finger; She does report any discomfort associated with her presenting concern. She is concerned about possible progression of her Dupuytren's Contracture at this time. I have today reviewed with Krishna Lopez the clinically relevant, past medical history, medications, allergies,  family history, social history, and Review Of Systems & I have documented any details relevant to today's presenting complaints in my history above. Ms. Tram Miller Marstaryn's self-reported past medical history, medications, allergies,  family history, social history, and Review Of Systems have been scanned into the chart under the \"Media\" tab. Physical Exam:  Vitals  Temp: 97.1 °F (36.2 °C)  Temp Source: Infrared  Height: 5' 5\" (165.1 cm)  Weight: 220 lb (99.8 kg)  Ms. Krishna Lopez appears well, she is in no apparent distress, she demonstrates appropriate mood & affect. Skin: Normal Color, Free of Lesions and Clinical evidence of Dupuytren's Contracture Left side   Thickening with solitary nodule formation is noted in the palm along the axis of the left Ring Finger. There is not extension into the digit itself. There is no contracture of the left Ring Finger MP joint. All other digital range of motion is Full bilaterally  Wrist range of motion is Full and equal bilaterally otherwise normal bilaterally  There is no evidence of gross joint instability bilaterally. Sensation is normal bilaterally  Vascular examination reveals normal and good capillary refill bilaterally. There is no Swelling bilaterally. Muscular strength is clinically appropriate bilaterally.     There is a 1 cm firm hard solitary nodule on the palm in the axis of the left Ring Finger  without extension into the Ring Finger  to the level of the Metacarpo-Phalangeal Joint. The \"Table Top Test\" is negative. Impression:  Ms. Santiago Joel is showing clinical evidence of Dupuytren's Contracture, and presents requesting further treatment. Plan:  I have had a thorough discussion with Ms. Santiago Joel regarding the treatment options available for her not substantially changed left  Ring Finger Dupuytren's Contracture, which is causing her significant concern and difficulty. I have outlined for Ms. Ana Maria Taylor the risk, benefits and consequences of the various treatment modalities, including a reasonable expectation for the long term success of each. We have discussed the likelihood that further more aggressive treatment may be required for her current presenting condition. Based upon our current discussion and a reasonable understating of the options available to her, Ms. Santiago Joel has selected to proceed with a conservative plan of treatment consisting of: careful observation, intermittent reassessment of the degree of contracture (Table-Top Test) and maintenance of full range of motion of the uninvolved digits. I have clearly explained to her that the above outlined treatment plan should not be expected to 'cure' her  Dupuytren's Contracture or resolve his current contractures, but we are rather treating the symptoms with which she presents. She has understood that in order to achieve more durable relief of her symptoms and to prevent future worsening or further damage, that definitive treatment, in the form of surgery or enzyme injection, would be required. Ms. Santiago Joel  voiced an appropriate understanding of our discussion, the options available to her, and of the expectations of her selected  treatment. I did discuss with Ms. Santiago Joel today the possibility that surgical (or other) treatment in a hand which appears to have some Dupuytren's Contracture may run the risk of accellerating or stimulating the progression of her Dupuytren's Contracture and/or worsening her contracture. She voiced an understanding of this possibility and did wish to proceed. I have also discussed with Ms. Carl Layne  the other treatment options available to her  for this condition. We have today selected to proceed with conservative management. She and I have agreed that if our current course of conservative treatment does not prove to be effective over the short term future, that she will schedule a follow-up appointment to discuss and select an alternate course of therapy including possibly injection or surgical treatment. Ms. Carl Layne has been given a full verbal list of instructions and precautions related to her present condition. I have asked her to followup with me in the office at the prescribed time. She is also specifically requested to call or return to the office sooner if her symptoms change or worsen prior to the next scheduled appointment.

## 2021-03-31 NOTE — PATIENT INSTRUCTIONS
Thank you for choosing [facility]) [facility]icians for your Hand and Upper Extremity needs. If we can be of any further assistance to you, please do not hesitate to contact us.     Office Phone Number:  [phone]  or  [phone]

## 2021-04-29 DIAGNOSIS — E03.9 ACQUIRED HYPOTHYROIDISM: ICD-10-CM

## 2021-04-29 RX ORDER — LEVOTHYROXINE SODIUM 88 MCG
88 TABLET ORAL DAILY
Qty: 30 TABLET | Refills: 2 | Status: SHIPPED | OUTPATIENT
Start: 2021-04-29 | End: 2021-09-08

## 2021-04-29 NOTE — TELEPHONE ENCOUNTER
Patient is requesting synthroid 88 mcg take 1 tablet by mouth daily. Ana Maria said Dr. Francesca Rangel had rx it for her in the past but is not longer in practice. Patient's pharmacy is [place], [place] [address]. No future appointments. Past appointment was 1/24/19 with Kailee Cha CNP.

## 2021-09-07 DIAGNOSIS — E03.9 ACQUIRED HYPOTHYROIDISM: ICD-10-CM

## 2021-09-08 RX ORDER — LEVOTHYROXINE SODIUM 88 MCG
TABLET ORAL
Qty: 30 TABLET | Refills: 2 | Status: SHIPPED | OUTPATIENT
Start: 2021-09-08 | End: 2022-01-18

## 2021-10-22 ENCOUNTER — NURSE TRIAGE (OUTPATIENT)
Dept: OTHER | Facility: CLINIC | Age: 61
End: 2021-10-22

## 2021-10-22 NOTE — TELEPHONE ENCOUNTER
Received call from Jil Osgood at [facility] with Red Flag Complaint. Pt disconnected with [facility]. Called pt back on verified phone number by [facility]. Brief description of triage: finger pain    Triage indicates for patient to see within 3 days in office. Pt informed if unable to get an appointment to go to urgent care or Emergency Department. Pt agreeable with plan. Care advice provided, patient verbalizes understanding; denies any other questions or concerns; instructed to call back for any new or worsening symptoms. Writer provided warm transfer to [facility] at [facility] for appointment scheduling. Attention Provider: Thank you for allowing me to participate in the care of your patient. The patient was connected to triage in response to information provided to the E[facility]. Please do not respond through this encounter as the response is not directed to a shared pool. Reason for Disposition   Patient wants to be seen    Answer Assessment - Initial Assessment Questions  1. ONSET: \"When did the pain start? \"       Last week woke up with soreness. States it was distal area 5th digit right hand. Dull pain. States bumped it last night and finger became red, hot, swollen. and increased pain. States redness and hotness has resolved. Has been using ice pack and Ibuprofen. 2. LOCATION and RADIATION: \"Where is the pain located? \"  (e.g., fingertip, around nail, joint, entire   finger)       No radiating    3. SEVERITY: \"How bad is the pain? \" \"What does it keep you from doing? \"   (Scale 1-10; or mild, moderate, severe)   - MILD - doesn't interfere with normal activities    - MODERATE - interferes with normal activities or awakens from sleep   - SEVERE - excruciating pain, unable to hold a glass of water or bend finger even a little      2/10 at this time. 10/10 if is straight and bumped. 4. APPEARANCE: \"What does the finger look like? \" (e.g., redness, swelling, bruising, pallor) Swollen, slight redness. 5. WORK OR EXERCISE: \"Has there been any recent work or exercise that involved this part of the body? \"      No work or exercise    6. CAUSE: \"What do you think is causing the pain? \"      Unsure    7. AGGRAVATING FACTORS: \"What makes the pain worse? \" (e.g., using computer)      Full flexion or extension. 8. OTHER SYMPTOMS: \"Do you have any other symptoms? \" (e.g., fever, neck pain, numbness)      No numbness or tingling. No s/s of insect bite. No itching. No red streak. Not cold to the touch. 9. PREGNANCY: \"Is there any chance you are pregnant? \" \"When was your last menstrual period? \"      LMP post menopausal.    Protocols used:  FINGER PAIN-ADULT-[place]

## 2021-11-03 ENCOUNTER — OFFICE VISIT (OUTPATIENT)
Dept: ORTHOPEDIC SURGERY | Age: 61
End: 2021-11-03
Payer: COMMERCIAL

## 2021-11-03 VITALS — BODY MASS INDEX: 36.65 KG/M2 | RESPIRATION RATE: 15 BRPM | HEIGHT: 65 IN | WEIGHT: 220 LBS

## 2021-11-03 DIAGNOSIS — M72.0 DUPUYTREN'S CONTRACTURE: Primary | ICD-10-CM

## 2021-11-03 PROCEDURE — 99213 OFFICE O/P EST LOW 20 MIN: CPT | Performed by: ORTHOPAEDIC SURGERY

## 2021-11-03 NOTE — Clinical Note
Dear  Amparo Barnard, DO,    Thank you very much for your referral or Ms. Spann Marking to me for evaluation and treatment of her Hand & Wrist condition. I appreciate your confidence in me and thank you for allowing me the opportunity to care for your patients. If I can be of any further assistance to you on this or any other patient, please do not hesitate to contact me. Sincerely,    Danuta Mayfield.  Mark Rankin MD

## 2021-11-03 NOTE — PATIENT INSTRUCTIONS
Screening Evaluation for progression of Dupuytren's Contracture    Every 3 - 6 months, perform the \"TableTop Test\". (Illustrated below)    If you are able to place your hand flat on hard surface (table, counter, etc) then there is no treatment needed. If you find that you are unable to place your hand flat on hard surface, that usually indicates a 30 degree contracture of a finger. When TableTop test turns \"positive\" (can not lay hand flat anymore) this is the time to think about treatment of Dupuytren's Contracture. I recommend scheduling a follow-up appointment if/when your TableTop Test turns positive or if you think you range of motion is continuing to worsen. Call the office at [phone] or [phone] to schedule a follow-up appointment for your Dupuytren's Contracture.

## 2021-11-03 NOTE — PROGRESS NOTES
Ms. Daniele Cantu  returns today regarding left Ring Finger and Small Finger Dupuytren's Contracture for which she was last evaluated in March 2021. Treatment up to this time has included: No prior treatment. She has recently noted the development palmar thickening on the right hand which  has been increasing with time. She has noted any limitation of motion of the Small Finger; She does not report any discomfort associated with her presenting concern. I have today reviewed with Daniele Cantu the clinically relevant, past medical history, medications, allergies,  family history, social history, and Review Of Systems & I have documented any details relevant to today's presenting complaints in my history above. Ms. Bk Taylor's self-reported past medical history, medications, allergies,  family history, social history, and Review Of Systems have been scanned into the chart under the \"Media\" tab. Physical Exam:  Vitals  Resp: 15  Height: 5' 5\" (165.1 cm)  Weight: 220 lb (99.8 kg)  Ms. Daniele Cantu appears well, she is in no apparent distress, she demonstrates appropriate mood & affect. Skin: Normal Color, Free of Lesions and Clinical evidence of Dupuytren's Contracture Bilateral   There is a solitary nodule on the palm,on the right in the axis of the Ring Finger and Small Finger without involvement of the Ring Finger and Small Finger to the level of the MCP Joint. Finger joint range of motion is not abnormal; there is not a contracture of the bilaterally Ring Finger and Small Finger. All other digital range of motion is unchanged from prior examination bilaterally  Wrist range of motion is without significant limitation bilaterally  There is no evidence of gross joint instability bilaterally. Sensation is normal bilaterally  Vascular examination reveals normal and good capillary refill bilaterally. There is no Swelling bilaterally. Muscular strength is clinically appropriate bilaterally.   The \"Table Top Test\" is negative. Impression:  Ms. Donnella Sacks is showing clinical evidence of Dupuytren's Contracture, and presents requesting further treatment. Plan:    I have had a thorough discussion with Ms. Donnella Sacks regarding the treatment options available for her new onset right  Ring Finger and Small Finger Dupuytren's Contracture, which is causing her significant concern and difficulty. I have outlined for Ms. Ana Maria Taylor the risk, benefits and consequences of the various treatment modalities, including a reasonable expectation for the long term success of each. We have discussed the likelihood that further more aggressive treatment may be required for her current presenting condition. Based upon our current discussion and a reasonable understating of the options available to her, Ms. Donnella Sacks has selected to proceed with a conservative plan of treatment consisting of: careful observation, intermittent reassessment of the degree of contracture (Table-Top Test) and maintenance of full range of motion of the uninvolved digits. I have clearly explained to her that the above outlined treatment plan should not be expected to 'cure' her  Dupuytren's Contracture or resolve his current contractures, but we are rather treating the symptoms with which she presents. She has understood that in order to achieve more durable relief of her symptoms and to prevent future worsening or further damage, that definitive treatment, in the form of surgery or enzyme injection, would be required. Ms. Donnella Sacks  voiced an appropriate understanding of our discussion, the options available to her, and of the expectations of her selected  treatment. I have also discussed with Ms. Donnella Sacks  the other treatment options available to her  for this condition. We have today selected to proceed with conservative management.   She and I have agreed that if our current course of conservative treatment does not prove to be effective over the short term future, that she will schedule a follow-up appointment to discuss and select an alternate course of therapy including possibly injection or surgical treatment. I have asked Ms. Belinda Ellis  to feel free to contact me or schedule a follow-up appointment at any time that she feels the need for any further evaluation or treatment for her upper extremitiy condition. If she feels that she continues to be feeling and functioning well, she may choose not to seek any further follow-up or treatment at her discretion. I will remain available to continue her care at any time in the future.

## 2022-01-27 DIAGNOSIS — E03.9 ACQUIRED HYPOTHYROIDISM: Primary | ICD-10-CM

## 2022-01-27 DIAGNOSIS — E03.9 ACQUIRED HYPOTHYROIDISM: ICD-10-CM

## 2022-01-27 LAB
T4 FREE: 1.6 NG/DL (ref 0.9–1.8)
TSH SERPL DL<=0.05 MIU/L-ACNC: 3.48 UIU/ML (ref 0.27–4.2)

## 2022-01-28 ENCOUNTER — OFFICE VISIT (OUTPATIENT)
Dept: FAMILY MEDICINE CLINIC | Age: 62
End: 2022-01-28
Payer: COMMERCIAL

## 2022-01-28 VITALS
WEIGHT: 223 LBS | OXYGEN SATURATION: 96 % | BODY MASS INDEX: 37.15 KG/M2 | HEART RATE: 62 BPM | SYSTOLIC BLOOD PRESSURE: 147 MMHG | DIASTOLIC BLOOD PRESSURE: 91 MMHG | HEIGHT: 65 IN

## 2022-01-28 DIAGNOSIS — E03.8 HYPOTHYROIDISM DUE TO HASHIMOTO'S THYROIDITIS: Primary | ICD-10-CM

## 2022-01-28 DIAGNOSIS — E06.3 HYPOTHYROIDISM DUE TO HASHIMOTO'S THYROIDITIS: Primary | ICD-10-CM

## 2022-01-28 DIAGNOSIS — Z12.31 SCREENING MAMMOGRAM, ENCOUNTER FOR: ICD-10-CM

## 2022-01-28 DIAGNOSIS — E55.9 VITAMIN D DEFICIENCY: ICD-10-CM

## 2022-01-28 DIAGNOSIS — E66.01 MORBID OBESITY (HCC): ICD-10-CM

## 2022-01-28 DIAGNOSIS — Z13.220 SCREENING FOR LIPID DISORDERS: ICD-10-CM

## 2022-01-28 PROCEDURE — 99214 OFFICE O/P EST MOD 30 MIN: CPT | Performed by: FAMILY MEDICINE

## 2022-01-28 SDOH — ECONOMIC STABILITY: FOOD INSECURITY: WITHIN THE PAST 12 MONTHS, YOU WORRIED THAT YOUR FOOD WOULD RUN OUT BEFORE YOU GOT MONEY TO BUY MORE.: NEVER TRUE

## 2022-01-28 SDOH — ECONOMIC STABILITY: FOOD INSECURITY: WITHIN THE PAST 12 MONTHS, THE FOOD YOU BOUGHT JUST DIDN'T LAST AND YOU DIDN'T HAVE MONEY TO GET MORE.: NEVER TRUE

## 2022-01-28 ASSESSMENT — PATIENT HEALTH QUESTIONNAIRE - PHQ9
SUM OF ALL RESPONSES TO PHQ QUESTIONS 1-9: 0
SUM OF ALL RESPONSES TO PHQ9 QUESTIONS 1 & 2: 0
2. FEELING DOWN, DEPRESSED OR HOPELESS: 0
SUM OF ALL RESPONSES TO PHQ QUESTIONS 1-9: 0
1. LITTLE INTEREST OR PLEASURE IN DOING THINGS: 0

## 2022-01-28 ASSESSMENT — SOCIAL DETERMINANTS OF HEALTH (SDOH): HOW HARD IS IT FOR YOU TO PAY FOR THE VERY BASICS LIKE FOOD, HOUSING, MEDICAL CARE, AND HEATING?: NOT HARD AT ALL

## 2022-01-28 NOTE — PATIENT INSTRUCTIONS
Ideally your blood pressure goal should be below 130/80. I usually add or adjust medication if the blood pressure is consistently elevated above 140/90. Once weekly for next 12 weeks. You can learn more about blood pressure and ways to incorporate a healthy lifestlye to help treat it from the American Heart Association website: www.heart. org under the  Getting Healthy link, and the [address] website: www.nhlbi.nih.gov/hbp    A for Activity  It helps your blood pressure when you exercise regularly. You should try to exercise at least 3 times a week for 20 minutes at a pace that you can comfortably carry on a conversation without being out of breath. B for BMI  The Body Mass Index should be below 30. This is your weight and height measurement. A BMI below 30 is preferred to help lower the risk of Type 2 diabetes, high cholesterol, heart disease, high blood pressure, sleep apnea, gallbladder disease and strokes. C for Control your Salt Intake  Avoid adding salt to your food. Avoid processed food, fast food, and junk food which all has high levels of sodium added. Read labels and get no more than 1500-2300mg of sodium a day. Decrease high carb foods if your blood sugar is high. High carbohydrate foods are:  Breads muffins rolls and bagels  Past rice corn and grains  Potatoes sweet potatoes  Legumes: peas beans lentils. Milk ( almond milk ok)  Most fruit except berries  Cake cookies pies ice cream candy etc.  Juices, soda, sweetened ice tea  Beer. For your weight, exercise 30 minutes 5 times weekly and improve the types of food you eat:    Protein   Best options: The American Diabetes Association (ADA) recommends lean proteins low in saturated fat, like fish or turkey. Aim for two servings of seafood each week; some fish, like salmon, have the added benefit of containing heart healthy omega-3 fats.  For a vegetarian protein source, experiment with the wide variety of beans. Nuts, which are protein and healthy fats powerhouses, are also a choice.  Worst options: Processed deli meats and hot dogs have high amounts of fat along with lots of sodium, which can increase the risk of high blood pressure. Heart attack and stroke are two common complications of diabetes, so keeping blood pressure in check is important. Grains   Best options: When choosing grains, make sure theyre whole. Decrease the amount of foods which contain flour. Whole grains such as wild rice, quinoa, and whole grain breads and cereals contain fiber, which is beneficial for digestive health, but often the grains flour products raise your blood sugar and when your blood sugar returns to normal, it can trigger the desire to eat again.   Worst options: Refined white flour doesnt contain the same health benefits as whole grains. Processed foods made with white flour include breakfast cereals, white bread, and pastries, cookies, muffins, pretzels, crackers, so avoid these options. Also try to steer clear of white rice and pasta. Dairy   Best options: With only 6 to 8 grams of carbohydrates in a serving, plain nonfat [place] yogurt is a healthy and versatile dairy option. You can add berries and enjoy it for dessert or breakfast; you can use it in recipes as a replacement for sour cream, which is high in saturated fat. Skim milk.  Worst options: Avoid all full-fat dairy products and especially packaged chocolate milk, as it also has added sugar. Avoid yogurts with added sugar. Vegetables   Best options: Non-starchy vegetables such as leafy greens, broccoli, cauliflower, asparagus, and carrots are low in carbohydrates and high in fiber and other nutrients, David Corcoran says. You can eat non-starchy vegetables in abundance  half of your plate should be filled with these veggies. If youre craving mashed potatoes, give mashed cauliflower a try.    Worst options: Stick to small portions of starchy vegetables such as corn, potatoes, and peas. These items are nutritious, but should be eaten in moderation. The ADA groups them with grains because of their high carb content. Fruit   Best options: Fresh fruit can conquer your craving for sweets while providing antioxidants and fiber. Berries are a great option because recommended portion sizes are typically generous, which may leave you feeling more satisfied   Worst options: Avoid added sugar by eliminating fruits canned in syrup, and be aware that dried fruits have a very high sugar concentration. Also, fruit juices should be consumed rarely as theyre high in sugar and dont contain the same nutrients as whole fruit. Fats   Best options: Some types of fat actually help protect your heart. Choose the monounsaturated fats found in avocados, almonds, olives, and pecans or the polyunsaturated fats found in walnuts and sunflower oil, which can help to lower bad cholesterol.  Worst options: Saturated fats increase bad cholesterol, so limit butter, cheese, gravy, and fried foods. Keep calories from animal sources of saturated fat to less than 10 percent of your total daily intake. Trans fats are even worse than saturated fats, so avoid them completely. Look for the term hydrogenated on labels of processed foods such as packaged snacks, baked goods, and crackers.

## 2022-01-28 NOTE — PROGRESS NOTES
01/28/22      Ana Maria [address] [facility]  Chief Complaint   Patient presents with    Thyroid Problem     CHECK          Diagnosis Orders   1. Hypothyroidism due to Hashimoto's thyroiditis  Comprehensive Metabolic Panel   2. Vitamin D deficiency  Vitamin D 25 Hydroxy    Comprehensive Metabolic Panel   3. Screening mammogram, encounter for  JIMMY ELLE DIGITAL SCREEN BILATERAL   4. Morbid obesity ([address] BMI 37  Comprehensive Metabolic Panel   5. Screening for lipid disorders  Lipid Panel     Ana Maria was seen today for thyroid problem. Diagnoses and all orders for this visit:    Hypothyroidism due to Hashimoto's thyroiditis  Levels in the normal range, ok to continue taking as is, which is 4-5 times weekly  Vitamin D deficiency  Cont same medication dose, and we can recheck levels  Screening mammogram, encounter for  -     JIMMY ELLE DIGITAL SCREEN BILATERAL; Future    Morbid obesity ([address] BMI 37  Will check lipids, check liver and kidney fxn, blood sugar fasting,    continue walking as exercising        HPI  Was following with joel for hypothyroidism and her endocrinologist is no longer with [facility]  TSH result is normal, T4 in the normal range. Also hx of Vit D def, takes 2000IU daily. should continue same dose. Pap recommendation: Age 33-67: cytology with HPV every 5 years. Vitals:    01/28/22 1258 01/28/22 1259   BP: (!) 142/91 (!) 147/91   Site: Left Lower Arm Right Lower Arm   Position: Sitting Sitting   Cuff Size: Medium Adult Medium Adult   Pulse: 62    SpO2: 96%    Weight: 223 lb (101.2 kg)    Height: 5' 5\" (1.651 m)      Body mass index is 37.11 kg/m².      Wt Readings from Last 3 Encounters:   01/28/22 223 lb (101.2 kg)   11/03/21 220 lb (99.8 kg)   03/31/21 220 lb (99.8 kg)     BP Readings from Last 3 Encounters:   01/28/22 (!) 147/91   03/03/20 120/82   11/06/19 132/78        Past Medical History:   Diagnosis Date    Acquired hypothyroidism 2/1/2017    Foreign body, hand, superficial, left, initial encounter 2019    Hyperthyroidism     Morbid obesity ([facility] [address] 2017     Social History     Socioeconomic History    Marital status:      Spouse name: Not on file    Number of children: Not on file    Years of education: Not on file    Highest education level: Not on file   Occupational History    Not on file   Tobacco Use    Smoking status: Former Smoker     Packs/day: 0.50     Years: 20.00     Pack years: 10.00     Types: Cigarettes     Quit date: 2010     Years since quittin.9    Smokeless tobacco: Never Used   Vaping Use    Vaping Use: Never used   Substance and Sexual Activity    Alcohol use: No    Drug use: No    Sexual activity: Yes   Other Topics Concern    Not on file   Social History Narrative    Not on file     Social Determinants of Health     Financial Resource Strain: Low Risk     Difficulty of Paying Living Expenses: Not hard at all   Food Insecurity: No Food Insecurity    Worried About [address] [place] [address] in the Last Year: Never true    Whit of Food in the Last Year: Never true   Transportation Needs:     Lack of Transportation (Medical): Not on file    Lack of Transportation (Non-Medical):  Not on file   Physical Activity:     Days of Exercise per Week: Not on file    Minutes of Exercise per Session: Not on file   Stress:     Feeling of Stress : Not on file   Social Connections:     Frequency of Communication with Friends and Family: Not on file    Frequency of Social Gatherings with Friends and Family: Not on file    Attends Cheondoism Services: Not on file    Active Member of Clubs or Organizations: Not on file    Attends Club or Organization Meetings: Not on file    Marital Status: Not on file   Intimate Partner Violence:     Fear of Current or Ex-Partner: Not on file    Emotionally Abused: Not on file    Physically Abused: Not on file    Sexually Abused: Not on file   Housing Stability:     Unable to Pay for Housing in the Last Year: Not on file    Number of Places Lived in the Last Year: Not on file    Unstable Housing in the Last Year: Not on file           Current Outpatient Medications   Medication Sig Dispense Refill    SYNTHROID 88 MCG tablet TAKE ONE TABLET BY MOUTH DAILY 30 tablet 1     No current facility-administered medications for this visit. Vitals:    01/28/22 1259   BP: (!) 147/91   Pulse:    SpO2:      BP Readings from Last 5 Encounters:   01/28/22 (!) 147/91   03/03/20 120/82   11/06/19 132/78   07/12/19 112/86   06/20/19 124/70       ROS:  No fever or chills  No unexpected wt loss  No headaches  No chest pain  No shortness of breath  No nausea, vomiting or diarrhea  No loss of coordination. Physical Exam  Well developed well nourished patient   in no acute distress. Alert and oriented to person, place, and time. HEENT:Normocephalic, atraumatic. No scleral icterus. Pupils normal  Heart: Regular Rate and Rhythm. Respirations: lungs clear to auscultation bilaterally. Abdomin: Bowel sounds present. Extremities: No peripheral edema. No erythema. Time spent today included for this patient visit includes time spent preparing to see the patient  Including review of tests, labs and imaging,   revewing previous history and recent encounters,   obtaining and/or reviewing separately obtained history in care everywhere,  performing a medically appropriate examination and/or evaluation;   counseling and educating the patient and /family/caregiver when present,  ordering medications, tests, or procedures;   referring to other health care specialists;   documenting clinical information in the electronic health record;   independently interpreting results (not separately reported)   and communicating results to the patient. Pap recommendations:    Age 33-67: cytology with HPV every 5 years. .         An electronic signature was used to authenticate this note. This was dictated. Errors mightbe possible due to dictation.   --Gerald Avila Lila Tong

## 2022-04-22 DIAGNOSIS — E03.9 ACQUIRED HYPOTHYROIDISM: ICD-10-CM

## 2022-04-22 RX ORDER — LEVOTHYROXINE SODIUM 88 MCG
TABLET ORAL
Qty: 30 TABLET | Refills: 5 | Status: SHIPPED | OUTPATIENT
Start: 2022-04-22

## 2022-05-13 ENCOUNTER — OFFICE VISIT (OUTPATIENT)
Dept: FAMILY MEDICINE CLINIC | Age: 62
End: 2022-05-13
Payer: COMMERCIAL

## 2022-05-13 VITALS
BODY MASS INDEX: 35.78 KG/M2 | SYSTOLIC BLOOD PRESSURE: 130 MMHG | TEMPERATURE: 97.8 F | WEIGHT: 215 LBS | RESPIRATION RATE: 16 BRPM | HEART RATE: 63 BPM | OXYGEN SATURATION: 97 % | DIASTOLIC BLOOD PRESSURE: 84 MMHG

## 2022-05-13 DIAGNOSIS — H93.8X2 PRESSURE SENSATION IN LEFT EAR: Primary | ICD-10-CM

## 2022-05-13 PROCEDURE — 99212 OFFICE O/P EST SF 10 MIN: CPT | Performed by: NURSE PRACTITIONER

## 2022-05-13 ASSESSMENT — PATIENT HEALTH QUESTIONNAIRE - PHQ9
SUM OF ALL RESPONSES TO PHQ QUESTIONS 1-9: 0
2. FEELING DOWN, DEPRESSED OR HOPELESS: 0
SUM OF ALL RESPONSES TO PHQ QUESTIONS 1-9: 0
1. LITTLE INTEREST OR PLEASURE IN DOING THINGS: 0
DEPRESSION UNABLE TO ASSESS: FUNCTIONAL CAPACITY MOTIVATION LIMITS ACCURACY
SUM OF ALL RESPONSES TO PHQ9 QUESTIONS 1 & 2: 0

## 2022-05-13 ASSESSMENT — ANXIETY QUESTIONNAIRES
IF YOU CHECKED OFF ANY PROBLEMS ON THIS QUESTIONNAIRE, HOW DIFFICULT HAVE THESE PROBLEMS MADE IT FOR YOU TO DO YOUR WORK, TAKE CARE OF THINGS AT HOME, OR GET ALONG WITH OTHER PEOPLE: NOT DIFFICULT AT ALL
GAD7 TOTAL SCORE: 0
2. NOT BEING ABLE TO STOP OR CONTROL WORRYING: 0
1. FEELING NERVOUS, ANXIOUS, OR ON EDGE: 0
7. FEELING AFRAID AS IF SOMETHING AWFUL MIGHT HAPPEN: 0
3. WORRYING TOO MUCH ABOUT DIFFERENT THINGS: 0
6. BECOMING EASILY ANNOYED OR IRRITABLE: 0
4. TROUBLE RELAXING: 0
5. BEING SO RESTLESS THAT IT IS HARD TO SIT STILL: 0

## 2022-05-13 NOTE — PROGRESS NOTES
2022     Chief Complaint   Patient presents with   Cele Pereyra (:  1960) is a 62 y.o. female, here for evaluation of the following medical concerns:    HPI    Left ear: felt clogged to 3-4 days, started OTC debrox drops. This morning woke up and symptoms resolved but wanted to have ear checked anyway. Feeling well otherwise. Denies other complaints or concerns. Review of Systems   HENT:        Ear pressure- resolved       Prior to Visit Medications    Medication Sig Taking? Authorizing Provider   SYNTHROID 88 MCG tablet TAKE ONE TABLET BY MOUTH DAILY Yes Enrique Hdez DO   SYNTHROID 88 MCG tablet Take 1 tablet by mouth Daily  Catarino Umaña MD        Social History     Tobacco Use    Smoking status: Former Smoker     Packs/day: 0.50     Years: 20.00     Pack years: 10.00     Types: Cigarettes     Quit date: 2010     Years since quittin.2    Smokeless tobacco: Never Used   Substance Use Topics    Alcohol use: No        Vitals:    22 1454   BP: 130/84   Site: Left Upper Arm   Position: Sitting   Pulse: 63   Resp: 16   Temp: 97.8 °F (36.6 °C)   TempSrc: Temporal   SpO2: 97%   Weight: 215 lb (97.5 kg)     Estimated body mass index is 35.78 kg/m² as calculated from the following:    Height as of 22: 5' 5\" (1.651 m). Weight as of this encounter: 215 lb (97.5 kg). Physical Exam  Vitals and nursing note reviewed. Constitutional:       General: She is not in acute distress. Appearance: Normal appearance. She is well-developed. She is obese. She is not ill-appearing, toxic-appearing or diaphoretic. HENT:      Head: Normocephalic and atraumatic. Right Ear: Tympanic membrane, ear canal and external ear normal. There is no impacted cerumen. Left Ear: Tympanic membrane, ear canal and external ear normal. There is no impacted cerumen. Eyes:      Extraocular Movements: Extraocular movements intact.       Pupils: Pupils are equal, round, and reactive to light. Cardiovascular:      Rate and Rhythm: Normal rate. Heart sounds: S1 normal and S2 normal.   Pulmonary:      Effort: Pulmonary effort is normal. No respiratory distress. Neurological:      General: No focal deficit present. Mental Status: She is alert and oriented to person, place, and time. Mental status is at baseline. Cranial Nerves: No cranial nerve deficit. Psychiatric:         Speech: Speech normal.         ASSESSMENT/PLAN:  1. Pressure sensation in left ear- resolved      Return if symptoms worsen or fail to improve. An electronic signature was used to authenticate this note.     --JAMES Zapien - CNP on 5/13/2022 at 3:28 PM

## 2022-06-25 ENCOUNTER — PATIENT MESSAGE (OUTPATIENT)
Dept: FAMILY MEDICINE CLINIC | Age: 62
End: 2022-06-25

## 2022-06-27 NOTE — TELEPHONE ENCOUNTER
From: Pierre Loud  To: Dr. Padmini Hooper: 6/25/2022 10:22 PM EDT  Subject: Pantera Narayanan has tested positive for [facility],  Pantera Narayanan, my daughter, just arrived back home from [facility] and this afternoon tested positive for coronavirus from a home test. So far, she's got a fever of 101, sore throat and a slight cough, which I'm treating with ibuprofen 200 mg, and hot tea w/ honey. She's staying hydrated and resting. Is there anything else I should be doing? Are there any prescriptions that would help?      ThanksAna Maria

## 2022-07-12 NOTE — PROGRESS NOTES
22      Ana Maria [address] [place] [address]  Chief Complaint   Patient presents with    Tinnitus     left and right ear / started in May/ feels pressure           Diagnosis Orders   1. Right otitis media, unspecified otitis media type  amoxicillin-clavulanate (AUGMENTIN) 875-125 MG per tablet    Arvind Riley DO, Otolaryngology, [facility]   2. Bilateral tinnitus  amoxicillin-clavulanate (AUGMENTIN) 875-125 MG per tablet    Arvind Riley DO, Otolaryngology, [facility] Leroy was seen today for tinnitus. Diagnoses and all orders for this visit:    Right otitis media, unspecified otitis media type  -     amoxicillin-clavulanate (AUGMENTIN) 875-125 MG per tablet; Take 1 tablet by mouth 2 times daily for 10 days  -     Arvind Dunbar DO, Otolaryngology, [facility]    Bilateral tinnitus  -     amoxicillin-clavulanate (AUGMENTIN) 875-125 MG per tablet; Take 1 tablet by mouth 2 times daily for 10 days  -     Sanam Hurtado DO, Otolaryngology, [facility]        HPI  Started after covid, with left ear pain, and right as well, left ear sounds like people talking in a tin can.no drainage. Has had a negative covid test recently.   Does have hx of allergies, uses antihistamines    Past Medical History:   Diagnosis Date    Acquired hypothyroidism 2017    Foreign body, hand, superficial, left, initial encounter 2019    Hyperthyroidism     Morbid obesity [address] [address] 2017     Social History     Socioeconomic History    Marital status:      Spouse name: Not on file    Number of children: Not on file    Years of education: Not on file    Highest education level: Not on file   Occupational History    Not on file   Tobacco Use    Smoking status: Former Smoker     Packs/day: 0.50     Years: 20.00     Pack years: 10.00     Types: Cigarettes     Quit date: 2010     Years since quittin.4    Smokeless tobacco: Never Used   Vaping Use    Vaping Use: Never used Substance and Sexual Activity    Alcohol use: No    Drug use: No    Sexual activity: Yes   Other Topics Concern    Not on file   Social History Narrative    Not on file     Social Determinants of Health     Financial Resource Strain: Low Risk     Difficulty of Paying Living Expenses: Not hard at all   Food Insecurity: No Food Insecurity    Worried About Running Out of Food in the Last Year: Never true    Whit of Food in the Last Year: Never true   Transportation Needs:     Lack of Transportation (Medical): Not on file    Lack of Transportation (Non-Medical): Not on file   Physical Activity:     Days of Exercise per Week: Not on file    Minutes of Exercise per Session: Not on file   Stress:     Feeling of Stress : Not on file   Social Connections:     Frequency of Communication with Friends and Family: Not on file    Frequency of Social Gatherings with Friends and Family: Not on file    Attends Evangelical Services: Not on file    Active Member of [address] [place] [address] or Organizations: Not on file    Attends Club or Organization Meetings: Not on file    Marital Status: Not on file   Intimate Partner Violence:     Fear of Current or Ex-Partner: Not on file    Emotionally Abused: Not on file    Physically Abused: Not on file    Sexually Abused: Not on file   Housing Stability:     Unable to Pay for Housing in the Last Year: Not on file    Number of [place] in the Last Year: Not on file    Unstable Housing in the Last Year: Not on file           Current Outpatient Medications   Medication Sig Dispense Refill    Cetirizine HCl (ZYRTEC ALLERGY PO)       amoxicillin-clavulanate (AUGMENTIN) 875-125 MG per tablet Take 1 tablet by mouth 2 times daily for 10 days 20 tablet 0    SYNTHROID 88 MCG tablet TAKE ONE TABLET BY MOUTH DAILY 30 tablet 5     No current facility-administered medications for this visit.        Vitals:    07/13/22 0831   BP: 134/82   Pulse: 66   SpO2: 94%     BP Readings from Last 5 Encounters:   07/13/22 134/82   05/13/22 130/84   01/28/22 (!) 147/91   03/03/20 120/82   11/06/19 132/78       ROS:  No fever or chills  No unexpected wt loss  No headaches  No chest pain  No shortness of breath  No nausea, vomiting or diarrhea  No loss of coordination. Physical Exam  Well developed well nourished patient   in no acute distress. Alert and oriented to person, place, and time. HEENT:Normocephalic, atraumatic. No scleral icterus. Pupils normal  Left TM clear, right TM + injection, + yellowish fluid behind TM  Heart: Regular Rate . Respirations:no tachypnea            An electronic signature was used to authenticate this note. This was dictated. Errors mightbe possible due to dictation.   --Mariluz Agrawal, DO

## 2022-07-13 ENCOUNTER — OFFICE VISIT (OUTPATIENT)
Dept: FAMILY MEDICINE CLINIC | Age: 62
End: 2022-07-13
Payer: COMMERCIAL

## 2022-07-13 VITALS
OXYGEN SATURATION: 94 % | SYSTOLIC BLOOD PRESSURE: 134 MMHG | DIASTOLIC BLOOD PRESSURE: 82 MMHG | HEART RATE: 66 BPM | HEIGHT: 65 IN | WEIGHT: 223 LBS | BODY MASS INDEX: 37.15 KG/M2

## 2022-07-13 DIAGNOSIS — H66.91 RIGHT OTITIS MEDIA, UNSPECIFIED OTITIS MEDIA TYPE: Primary | ICD-10-CM

## 2022-07-13 DIAGNOSIS — H93.13 BILATERAL TINNITUS: ICD-10-CM

## 2022-07-13 PROCEDURE — 99213 OFFICE O/P EST LOW 20 MIN: CPT | Performed by: FAMILY MEDICINE

## 2022-07-13 RX ORDER — AMOXICILLIN AND CLAVULANATE POTASSIUM 875; 125 MG/1; MG/1
1 TABLET, FILM COATED ORAL 2 TIMES DAILY
Qty: 20 TABLET | Refills: 0 | Status: SHIPPED | OUTPATIENT
Start: 2022-07-13 | End: 2022-07-23

## 2022-07-21 ENCOUNTER — OFFICE VISIT (OUTPATIENT)
Dept: ENT CLINIC | Age: 62
End: 2022-07-21
Payer: COMMERCIAL

## 2022-07-21 VITALS — BODY MASS INDEX: 37.15 KG/M2 | HEIGHT: 65 IN | WEIGHT: 223 LBS | TEMPERATURE: 97.9 F | RESPIRATION RATE: 16 BRPM

## 2022-07-21 DIAGNOSIS — H91.92 HEARING LOSS OF LEFT EAR, UNSPECIFIED HEARING LOSS TYPE: ICD-10-CM

## 2022-07-21 DIAGNOSIS — H93.13 TINNITUS AURIUM, BILATERAL: ICD-10-CM

## 2022-07-21 DIAGNOSIS — J30.9 ALLERGIC RHINITIS, UNSPECIFIED SEASONALITY, UNSPECIFIED TRIGGER: ICD-10-CM

## 2022-07-21 DIAGNOSIS — R09.82 POSTNASAL DRIP: Primary | ICD-10-CM

## 2022-07-21 PROCEDURE — 99204 OFFICE O/P NEW MOD 45 MIN: CPT | Performed by: OTOLARYNGOLOGY

## 2022-07-21 RX ORDER — FLUTICASONE PROPIONATE 50 MCG
1 SPRAY, SUSPENSION (ML) NASAL DAILY
COMMUNITY
End: 2022-07-21 | Stop reason: ALTCHOICE

## 2022-07-21 RX ORDER — SODIUM CHLORIDE/SODIUM BICARB
1 PACKET (EA) NASAL 2 TIMES DAILY
Qty: 200 EACH | Refills: 1 | Status: SHIPPED | OUTPATIENT
Start: 2022-07-21

## 2022-07-21 RX ORDER — FLUTICASONE PROPIONATE 50 MCG
1 SPRAY, SUSPENSION (ML) NASAL 2 TIMES DAILY
Qty: 16 G | Refills: 2 | Status: SHIPPED | OUTPATIENT
Start: 2022-07-21

## 2022-07-21 ASSESSMENT — ENCOUNTER SYMPTOMS
SINUS PAIN: 0
RHINORRHEA: 1
WHEEZING: 0
ABDOMINAL PAIN: 0
SINUS PRESSURE: 0
SHORTNESS OF BREATH: 0

## 2022-07-21 NOTE — PROGRESS NOTES
Federica Mckeon   1960, 62 y.o. female   [id number]       Referring Provider: Debbi Agarwal MD  Referral Type: In an order in [address] [place] [address]    Reason for Visit: Evaluation of the cause of disorders of hearing, tinnitus, or balance. ADULT AUDIOLOGIC EVALUATION      Federica Mckeon is a 62 y.o. female seen today, 7/22/2022 , for an initial audiologic evaluation. Patient was alone. AUDIOLOGIC AND OTHER PERTINENT MEDICAL HISTORY:      Ana Maria Taylor noted aural fullness and tinnitus. Patient reports intermittent bilateral aural fullness and tinnitus. She noted relief in tinnitus after she ceased the use of Zyrtec. Federica Mckeon denied otalgia, dizziness, history of occupational/recreational noise exposure, history of head trauma, history of ear surgery, and family history of hearing loss. Date: 7/22/2022     IMPRESSIONS:      Normal middle ear pressure and compliance, bilaterally. Essentially normal hearing sensitivity with excellent word understanding at normal conversation level, bilaterally. Follow medical recommendations of Debbi Agarwal MD.     ASSESSMENT AND FINDINGS:     Otoscopy revealed: Clear ear canals bilaterally    RIGHT EAR:  Hearing Sensitivity:Hearing sensitivity within normal limits from 250-8000 Hz  Speech Recognition Threshold: 15 dB HL  Word Recognition:Excellent (100%), based on NU-6 25-word list at 55 dBHL using recorded speech stimuli. Tympanometry: Normal peak pressure and compliance, Type A tympanogram, consistent with normal middle ear function. LEFT EAR:  Hearing Sensitivity: Normal hearing sensitivity to a mild sensorineural hearing loss from 250-8000 Hz  Speech Recognition Threshold: 20 dB HL  Word Recognition: Excellent (96%), based on NU-6 25-word list at 55 dBHL using recorded speech stimuli. Tympanometry: Normal peak pressure and compliance, Type A tympanogram, consistent with normal middle ear function. Reliability: Good   Transducer:  Inserts      See scanned audiogram dated 7/22/2022  for results. PATIENT EDUCATION:       The following items were discussed with the patient:   - Good Communication Strategies  - Tinnitus Management Strategies      Educational information was shared in the After Visit Summary. RECOMMENDATIONS:                                                                                                                                                                                                                                                            The following items are recommended based on patient report and results from today's appointment:   - Continue medical follow-up with Yvonne Castillo MD.   - Retest hearing as medically indicated and/or sooner if a change in hearing is noted. - Utilize \"Good Communication Strategies\" as discussed to assist in speech understanding with communication partners. - Maintain a sound enriched environment to assist in the management of tinnitus symptoms.        Guillermo Garcia  Audiologist    Chart CC'd to: Yvonne Castillo MD      Degree of   Hearing Sensitivity dB Range   Within Normal Limits (WNL) 0 - 20   Mild 20 - 40   Moderate 40 - 55   Moderately-Severe 55 - 70   Severe 70 - 90   Profound 90 +

## 2022-07-21 NOTE — PATIENT INSTRUCTIONS
Instructions for Sinus Rinses/Nasal Sprays  -Start hypertonic nasal saline rinses saline twice daily - use distilled water, or water that you have boiled (and that has cooled to room temperature). Use a salt packet in the sinus rinse bottle.  When performing rinses, please stand over your sink with your chin tucked into your chest. Use 4oz of the bottle in the left nasal passage, and 4oz in the right nasal passage  -Start topical nasal steroid sprays twice daily 15-20 minutes after sinus rinses - again, tuck chin to chest when using sprays and place the spray tip of the bottle in the nose, with the tip oriented toward the ceiling

## 2022-07-21 NOTE — PROGRESS NOTES
[facility] [address], [address]  [place], [place]  P: [phone]       Patient     Ana Maria Shipley  1960    Chief Concern     Chief Complaint   Patient presents with    New Patient     Patient states that she has an ear infection in the right ear, states that the left ear feels clogged on and off- States that she is concerned about the tinnitus being permanent       Assessment and Plan      Diagnosis Orders   1. Postnasal drip  Hypertonic Nasal Wash (SINUS RINSE REFILL) PACK    fluticasone (FLONASE) 50 MCG/ACT nasal spray      2. Allergic rhinitis, unspecified seasonality, unspecified trigger  Hypertonic Nasal Wash (SINUS RINSE REFILL) PACK    fluticasone (FLONASE) 50 MCG/ACT nasal spray      3. Tinnitus aurium, bilateral        4. Hearing loss of left ear, unspecified hearing loss type  Sanam Yni [place] Aiden RAY, Audiology, [facility]        Patient with recent left ear fullness and tinnitus-first in the left ear, now bilaterally. Tuning fork examination without findings, ear otherwise clear, able to lateralize tympanic membrane's on Valsalva-we will plan for audiometric testing tomorrow due to her schedule. There is concern for postnasal drip/allergic rhinitis, and we will start her on sinus rinses twice daily and Flonase twice daily. We will see her back in 6 weeks. Return in about 6 weeks (around 9/1/2022). History of Present Illness     62 y.o. female with history of hashimoto's thyroiditis, on levothyroxine, with concern for rhinorrhea and post-nasal drip that has been ongoing for 6 weeks/2 months - no allergy testing nor any history of seasonal allergies. No sinus pain or pressure, no changes in smell/taste, no diplopia/blurry vision, no purulent rhinorrhea. On flonase, zyrtec with possible improvement. From an otologic perspective, she has had several months of ear fullness in the left ear, along with tinnitus (\"radio waves\"), non-pulsatile, constant.

## 2022-07-22 ENCOUNTER — PROCEDURE VISIT (OUTPATIENT)
Dept: AUDIOLOGY | Age: 62
End: 2022-07-22
Payer: COMMERCIAL

## 2022-07-22 DIAGNOSIS — H90.42 SENSORINEURAL HEARING LOSS (SNHL) OF LEFT EAR WITH UNRESTRICTED HEARING OF RIGHT EAR: Primary | ICD-10-CM

## 2022-07-22 DIAGNOSIS — H93.13 TINNITUS OF BOTH EARS: ICD-10-CM

## 2022-07-22 DIAGNOSIS — H93.8X3 EAR PRESSURE, BILATERAL: ICD-10-CM

## 2022-07-22 PROCEDURE — 92567 TYMPANOMETRY: CPT | Performed by: AUDIOLOGIST

## 2022-07-22 PROCEDURE — 92557 COMPREHENSIVE HEARING TEST: CPT | Performed by: AUDIOLOGIST

## 2022-07-22 NOTE — PATIENT INSTRUCTIONS
Good Communication Strategies    Communication can be challenging for anyone, but can be especially difficult for those with some degree of hearing loss. While we may not be able to control every factor that may lead to difficulty with communication, there are Good Communication Strategies that we can all use in our day-to-day lives. Communication takes both parties working together for it to be successful. Tips as a Listener:   Control your environment. It is important to limit the amount of background noise in the room when possible. You should also consider having a good light source in the room to best see the other person. Ask for clarification. Instead of saying \"What?\", you can use parts of what you heard to make a new question. For example, if you heard the word \"Thursday\" but not the rest of the week, you may ask \"What was that about Thursday? \" or \"What did you want to do Thursday? \". This shows the person talking that you are listening and will help them better explain what they are saying. Be an advocate for yourself. If you are hearing but not understanding, tell the other person \"I can hear you, but I need you to slow down when you speak. \"  Or if someone is facing the other direction, say \"I cannot hear you when you are not looking at me when we talk. \"       Tips as a Talker:   - Sit or stand 3 to 6 feet away to maximize audibility         -- It is unrealistic to believe someone else will fully hear your message if you are speaking from across the room or in a different room in the house   - Stay at eye level to help with visual cues   - Make sure you have the persons attention before speaking   - Use facial expressions and gestures to accentuate your message   - Raise your voice slightly (do not scream)   - Speak slowly and distinctly   - Use short, simple sentences   - Rephrase your words if the person is having a hard time understanding you    - To avoid distortion, dont speak directly into a persons ear      Some additional items that may be helpful:   - Remain patient - this is important for both parties   - Write down items that still cannot be heard/understood. You may write with pen/paper or consider typing/texting on a cell phone or smart device. - If background noise is unavoidable, try to keep yourself in a good position in the room. By sitting at a mckinnon on the side of the restaurant (preferably a corner), it will be easier to communicate than if you were sitting at a table in the middle with background noise surrounding you. Keep yourself positioned away from music speakers or heavy foot traffic. Tinnitus: Overview and Management Strategies          Many people have some ringing sounds in their ears once in a while. You may hear a roar, a hiss, a tinkle, or a buzz. The sound usually lasts only a few minutes. If it goes on all the time, you may have tinnitus. Tinnitus is usually caused by long-term exposure to loud noise. This damages the nerves in the inner ear. It can occur with all types of hearing loss. It may be a symptom of almost any ear problem. Tinnitus may be caused by a buildup of earwax. Or, it may be caused by ear infections or certain medicines (especially antibiotics or large amounts of aspirin). You can also hear noises in your ears because of an injury to the ears, drinking too much alcohol or caffeine, or a medical condition. Other conditions may also contribute to tinnitus, including: head and neck trauma, temporomandibular joint disorder (TMJ), sinus pressure and barometric trauma, traumatic brain injury, metabolic disorders, autoimmune disorders, stress, and high blood pressure. You may need tests to evaluate your hearing and to find causes of long-lasting tinnitus. Your doctor may suggest one or more treatments to help you cope with the tinnitus. You can also do things at home to help reduce symptoms.     Follow-up care is a key part of your treatment and safety. Be sure to make and go to all appointments, and call your doctor if you are having problems. It's also a good idea to know your test results and keep a list of the medicines you take. How can you care for yourself at home? Limit or cut out alcohol, caffeine, and sodium. They can make your symptoms worse. Do not smoke or use other tobacco products. Nicotine reduces blood flow to the ear and makes tinnitus worse. If you need help quitting, talk to your doctor about stop-smoking programs and medicines. These can increase your chances of quitting for good. Talk to your doctor about whether to stop taking aspirin and similar products such as ibuprofen or naproxen. Get exercise often. It can help improve blood flow to the ear. Ways to manage/cope with tinnitus  Some tinnitus may last a long time. To manage your tinnitus, try to: Avoid noises that you think caused your tinnitus. If you can't avoid loud noises, wear earplugs or earmuffs. Ignore the sound by paying attention to other things. Keeping your brain busy with other tasks or background noise can help your brain not focus on the tinnitus. Try to not give the tinnitus an emotional reaction. Do your best to ignore the sound and not let it bother you. Relax using biofeedback, meditation, or yoga. Feeling stressed and being tired can make tinnitus worse. Play music or white noise to help you sleep. Background noise may cover up the noise that you hear in your ears. You can buy a tabletop machine or a device that sits under your pillow to play soothing sounds, like ocean waves. Smart phones have free apps, such as Whist, Relax Melodies, ReSound Relief, and [employer]. These apps have different types of sounds/noise, some of which you can blend together to find sounds that are most soothing to you.   Hearing aid technology, especially when there is some hearing loss, may help reduce tinnitus symptoms by giving your brain better access to the sounds it is missing. There are some hearing aids with built-in noise generator programs, which may help when amplification alone is not enough. Additional resources may be found through the [employer]inn[employer] Association at www.jorge.org    When should you call for help? Call 911 anytime you think you may need emergency care. For example, call if:    You have symptoms of a stroke. These may include:  Sudden numbness, tingling, weakness, or loss of movement in your face, arm, or leg, especially on only one side of your body. Sudden vision changes. Sudden trouble speaking. Sudden confusion or trouble understanding simple statements. Sudden problems with walking or balance. A sudden, severe headache that is different from past headaches. Call your doctor now or seek immediate medical care if:    You develop other symptoms. These may include hearing loss (or worse hearing loss), balance problems, dizziness, nausea, or vomiting. Watch closely for changes in your health, and be sure to contact your doctor if:    Your tinnitus moves from both ears to one ear. Your hearing loss gets worse within 1 day after an ear injury. Your tinnitus or hearing loss does not get better within 1 week after an ear injury. Your tinnitus bothers you enough that you want to take medicines to help you cope with it. If you notice changes in your tinnitus and/or your hearing, it is recommended that you have your hearing tested by your audiologist and to follow-up with your physician that manages your hearing loss (such as your ENT or Primary Care doctor).

## 2022-09-01 ENCOUNTER — OFFICE VISIT (OUTPATIENT)
Dept: ENT CLINIC | Age: 62
End: 2022-09-01
Payer: COMMERCIAL

## 2022-09-01 VITALS — HEIGHT: 65 IN | TEMPERATURE: 97.2 F | WEIGHT: 223 LBS | BODY MASS INDEX: 37.15 KG/M2 | RESPIRATION RATE: 16 BRPM

## 2022-09-01 DIAGNOSIS — R09.82 POSTNASAL DRIP: Primary | ICD-10-CM

## 2022-09-01 DIAGNOSIS — H93.13 TINNITUS, BILATERAL: ICD-10-CM

## 2022-09-01 PROCEDURE — 99212 OFFICE O/P EST SF 10 MIN: CPT | Performed by: OTOLARYNGOLOGY

## 2022-09-01 ASSESSMENT — ENCOUNTER SYMPTOMS
SINUS PAIN: 0
SHORTNESS OF BREATH: 0
ABDOMINAL PAIN: 0
SINUS PRESSURE: 0
RHINORRHEA: 1
WHEEZING: 0

## 2022-09-01 NOTE — PROGRESS NOTES
[address], [facility] [place]  P: [phone]       Patient     Berry Clipper  1960    Chief Concern     Chief Complaint   Patient presents with    Follow-up     Patient states that her hearing is now fine, states that she is still having ringing in the ears. States that all other symptoms have resolved. Has not needed any of the allergy medications       Assessment and Plan      Diagnosis Orders   1. Postnasal drip        2. Tinnitus, bilateral          Patient with recent left ear fullness and tinnitus-first in the left ear, now bilaterally. Tuning fork examination without findings, ear otherwise clear, able to lateralize tympanic membrane's on Valsalva-metric testing without significant findings. Postnasal drip/allergic rhinitis has resolved after a course of Flonase and sinus rinses. We will see her back on an as-needed basis-we have discussed further therapy for tinnitus including cognitive behavioral therapy, masking and she will follow-up with us if it becomes intrusive    Return if symptoms worsen or fail to improve. History of Present Illness     62 y.o. female with history of hashimoto's thyroiditis, on levothyroxine, with concern for rhinorrhea and post-nasal drip that has been ongoing for 6 weeks/2 months - no allergy testing nor any history of seasonal allergies. No sinus pain or pressure, no changes in smell/taste, no diplopia/blurry vision, no purulent rhinorrhea. On flonase, zyrtec with possible improvement. From an otologic perspective, she has had several months of ear fullness in the left ear, along with tinnitus (\"radio waves\"), non-pulsatile, constant. Tinnitus began in the right ear 2-3 weeks ago. Feels \"tinny\" sounding hearing loss in the left ear. Interval History 9/1/2022: Tinnitus began 05/2022 since starting cetirizine, has stopped taking this. Flonase and sinus rinses have resolved post-nasal drip.  Continuing this 1x weekly. Tinnitus is till present however not intrusive, and she is using masking with good effect. Past Medical History     Past Medical History:   Diagnosis Date    Acquired hypothyroidism 2017    Foreign body, hand, superficial, left, initial encounter 2019    Hyperthyroidism     Morbid obesity ([facility] [address] 2017       Past Surgical History     Past Surgical History:   Procedure Laterality Date    BACK SURGERY         Family History     Family History   Problem Relation Age of Onset    Heart Disease Mother        Social History     Social History     Tobacco Use    Smoking status: Former     Packs/day: 0.50     Years: 20.00     Pack years: 10.00     Types: Cigarettes     Quit date: 2010     Years since quittin.5    Smokeless tobacco: Never   Vaping Use    Vaping Use: Never used   Substance Use Topics    Alcohol use: No    Drug use: No        Allergies     No Known Allergies    Medications     Current Outpatient Medications   Medication Sig Dispense Refill    Hypertonic Nasal Wash (SINUS RINSE REFILL) PACK 1 packet by Nasal route 2 times daily (Patient taking differently: 1 packet by Nasal route 2 times daily Does rinse once a week) 200 each 1    SYNTHROID 88 MCG tablet TAKE ONE TABLET BY MOUTH DAILY 30 tablet 5    fluticasone (FLONASE) 50 MCG/ACT nasal spray 1 spray by Each Nostril route in the morning and 1 spray before bedtime. (Patient not taking: Reported on 2022) 16 g 2    Cetirizine HCl (ZYRTEC ALLERGY PO)  (Patient not taking: Reported on 2022)       No current facility-administered medications for this visit. Review of Systems     Review of Systems   Constitutional:  Negative for activity change, chills, diaphoresis, fatigue and fever. HENT:  Positive for hearing loss, postnasal drip, rhinorrhea and tinnitus. Negative for congestion, ear discharge, ear pain, sinus pressure and sinus pain. Eyes:  Negative for visual disturbance.    Respiratory:  Negative for shortness of breath and wheezing. Cardiovascular:  Negative for chest pain. Gastrointestinal:  Negative for abdominal pain. Musculoskeletal:  Negative for neck pain and neck stiffness. Skin:  Negative for rash. Neurological:  Negative for dizziness, light-headedness and headaches. Hematological:  Negative for adenopathy. PhysicalExam     Vitals:    09/01/22 0908   Resp: 16   Temp: 97.2 °F (36.2 °C)   TempSrc: Infrared   Weight: 223 lb (101.2 kg)   Height: 5' 5\" (1.651 m)       Physical Exam  Vitals reviewed. Constitutional:       Appearance: Normal appearance. HENT:      Head: Normocephalic and atraumatic. Right Ear: Tympanic membrane, ear canal and external ear normal. There is no impacted cerumen. Left Ear: Tympanic membrane, ear canal and external ear normal. There is no impacted cerumen. Ears:      Guerrier exam findings: Does not lateralize. Right Rinne: AC > BC. Left Rinne: AC > BC. Nose: No congestion or rhinorrhea. Mouth/Throat:      Lips: Pink. No lesions. Mouth: Mucous membranes are moist. No oral lesions. Tongue: No lesions. Tongue does not deviate from midline. Palate: No mass and lesions. Pharynx: Oropharynx is clear. Uvula midline. No oropharyngeal exudate or posterior oropharyngeal erythema. Eyes:      Extraocular Movements: Extraocular movements intact. Pupils: Pupils are equal, round, and reactive to light. Musculoskeletal:      Cervical back: Normal range of motion and neck supple. Lymphadenopathy:      Cervical: No cervical adenopathy. Neurological:      Mental Status: She is alert. Cranial Nerves: No cranial nerve deficit. Data Review           Procedure     None    Dwain Ragsdale MD  9/1/22    Portions of this note were dictated using Dragon.  There may be linguistic errors secondary to the use of this program.

## 2022-09-01 NOTE — PATIENT INSTRUCTIONS
-Continue sinus rinses, flonase as needed  -Consider repeat audiometric testing every 2-4 years  -Call if tinnitus is intrusive or you would like to pursue cognitive behavioral therapy for tinnitus

## 2022-11-08 DIAGNOSIS — E03.9 ACQUIRED HYPOTHYROIDISM: ICD-10-CM

## 2022-11-08 RX ORDER — LEVOTHYROXINE SODIUM 88 MCG
TABLET ORAL
Qty: 90 TABLET | Refills: 3 | Status: SHIPPED | OUTPATIENT
Start: 2022-11-08

## 2022-11-23 ENCOUNTER — COMMUNITY OUTREACH (OUTPATIENT)
Dept: FAMILY MEDICINE CLINIC | Age: 62
End: 2022-11-23

## 2022-11-23 NOTE — PROGRESS NOTES
Patient's HM shows they are overdue for Mammogram Screening. [facility] and  files searched. No results to attach to order nor HM updated.

## 2022-11-23 NOTE — PROGRESS NOTES
Patient's HM shows they are overdue for Mammogram Screening. [employer] and  files searched. No results to attach to order nor HM updated.

## 2023-01-17 ENCOUNTER — OFFICE VISIT (OUTPATIENT)
Dept: URGENT CARE | Age: 63
End: 2023-01-17

## 2023-01-17 VITALS
OXYGEN SATURATION: 95 % | RESPIRATION RATE: 16 BRPM | HEART RATE: 78 BPM | BODY MASS INDEX: 37.49 KG/M2 | WEIGHT: 225 LBS | DIASTOLIC BLOOD PRESSURE: 80 MMHG | HEIGHT: 65 IN | TEMPERATURE: 99.1 F | SYSTOLIC BLOOD PRESSURE: 128 MMHG

## 2023-01-17 DIAGNOSIS — L03.113 CELLULITIS OF RIGHT HAND: Primary | ICD-10-CM

## 2023-01-17 DIAGNOSIS — R03.0 ELEVATED BP WITHOUT DIAGNOSIS OF HYPERTENSION: ICD-10-CM

## 2023-01-17 RX ORDER — CEPHALEXIN 500 MG/1
500 CAPSULE ORAL 4 TIMES DAILY
Qty: 28 CAPSULE | Refills: 0 | Status: SHIPPED | OUTPATIENT
Start: 2023-01-17 | End: 2023-01-24

## 2023-01-17 RX ORDER — BACITRACIN ZINC 500 [USP'U]/G
OINTMENT TOPICAL
Qty: 30 G | Refills: 1 | COMMUNITY
Start: 2023-01-17

## 2023-01-17 ASSESSMENT — ENCOUNTER SYMPTOMS
COUGH: 0
SHORTNESS OF BREATH: 0

## 2023-01-17 NOTE — PATIENT INSTRUCTIONS
Complete antibiotics as prescribed. Bacitracin as recommended (samples provided)  Monitor BP at home and call PCP if persistently elevated. Follow up in 7 days if symptoms persist or if symptoms worsen. New Prescriptions    BACITRACIN ZINC 500 UNIT/GM OINTMENT    Apply topically 2 times daily.     CEPHALEXIN (KEFLEX) 500 MG CAPSULE    Take 1 capsule by mouth 4 times daily for 7 days
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
## 2023-02-02 ENCOUNTER — OFFICE VISIT (OUTPATIENT)
Dept: FAMILY MEDICINE CLINIC | Age: 63
End: 2023-02-02
Payer: COMMERCIAL

## 2023-02-02 VITALS
OXYGEN SATURATION: 99 % | HEART RATE: 68 BPM | SYSTOLIC BLOOD PRESSURE: 133 MMHG | BODY MASS INDEX: 37.44 KG/M2 | RESPIRATION RATE: 16 BRPM | DIASTOLIC BLOOD PRESSURE: 85 MMHG | WEIGHT: 225 LBS | TEMPERATURE: 97.8 F

## 2023-02-02 DIAGNOSIS — T23.161D SUPERFICIAL BURN OF BACK OF RIGHT HAND, SUBSEQUENT ENCOUNTER: Primary | ICD-10-CM

## 2023-02-02 DIAGNOSIS — Z13.220 SCREENING FOR LIPID DISORDERS: ICD-10-CM

## 2023-02-02 DIAGNOSIS — T49.95XA ADVERSE EFFECT OF DRUG THAT ACTS PRIMARILY ON SKIN, INITIAL ENCOUNTER: ICD-10-CM

## 2023-02-02 DIAGNOSIS — E03.9 ACQUIRED HYPOTHYROIDISM: ICD-10-CM

## 2023-02-02 PROCEDURE — 99213 OFFICE O/P EST LOW 20 MIN: CPT | Performed by: NURSE PRACTITIONER

## 2023-02-02 SDOH — ECONOMIC STABILITY: FOOD INSECURITY: WITHIN THE PAST 12 MONTHS, THE FOOD YOU BOUGHT JUST DIDN'T LAST AND YOU DIDN'T HAVE MONEY TO GET MORE.: NEVER TRUE

## 2023-02-02 SDOH — ECONOMIC STABILITY: FOOD INSECURITY: WITHIN THE PAST 12 MONTHS, YOU WORRIED THAT YOUR FOOD WOULD RUN OUT BEFORE YOU GOT MONEY TO BUY MORE.: NEVER TRUE

## 2023-02-02 SDOH — ECONOMIC STABILITY: HOUSING INSECURITY
IN THE LAST 12 MONTHS, WAS THERE A TIME WHEN YOU DID NOT HAVE A STEADY PLACE TO SLEEP OR SLEPT IN A SHELTER (INCLUDING NOW)?: NO

## 2023-02-02 SDOH — ECONOMIC STABILITY: INCOME INSECURITY: HOW HARD IS IT FOR YOU TO PAY FOR THE VERY BASICS LIKE FOOD, HOUSING, MEDICAL CARE, AND HEATING?: NOT HARD AT ALL

## 2023-02-02 ASSESSMENT — ANXIETY QUESTIONNAIRES
7. FEELING AFRAID AS IF SOMETHING AWFUL MIGHT HAPPEN: 0
1. FEELING NERVOUS, ANXIOUS, OR ON EDGE: 0
6. BECOMING EASILY ANNOYED OR IRRITABLE: 0
5. BEING SO RESTLESS THAT IT IS HARD TO SIT STILL: 0
3. WORRYING TOO MUCH ABOUT DIFFERENT THINGS: 0
GAD7 TOTAL SCORE: 0
IF YOU CHECKED OFF ANY PROBLEMS ON THIS QUESTIONNAIRE, HOW DIFFICULT HAVE THESE PROBLEMS MADE IT FOR YOU TO DO YOUR WORK, TAKE CARE OF THINGS AT HOME, OR GET ALONG WITH OTHER PEOPLE: NOT DIFFICULT AT ALL
4. TROUBLE RELAXING: 0
2. NOT BEING ABLE TO STOP OR CONTROL WORRYING: 0

## 2023-02-02 ASSESSMENT — ENCOUNTER SYMPTOMS
VOMITING: 0
SHORTNESS OF BREATH: 0
NAUSEA: 0
COUGH: 0
DIARRHEA: 0
COLOR CHANGE: 1

## 2023-02-02 ASSESSMENT — PATIENT HEALTH QUESTIONNAIRE - PHQ9
2. FEELING DOWN, DEPRESSED OR HOPELESS: 0
SUM OF ALL RESPONSES TO PHQ9 QUESTIONS 1 & 2: 0
SUM OF ALL RESPONSES TO PHQ QUESTIONS 1-9: 0
DEPRESSION UNABLE TO ASSESS: FUNCTIONAL CAPACITY MOTIVATION LIMITS ACCURACY
SUM OF ALL RESPONSES TO PHQ QUESTIONS 1-9: 0
1. LITTLE INTEREST OR PLEASURE IN DOING THINGS: 0
SUM OF ALL RESPONSES TO PHQ QUESTIONS 1-9: 0
SUM OF ALL RESPONSES TO PHQ QUESTIONS 1-9: 0

## 2023-02-02 NOTE — PROGRESS NOTES
2023     Chief Complaint   Patient presents with    Rash     Believes she may have had contact dermitis. Got third degree burn and used neosporin and went to urgent care to to this rash that appeared        Ana Maria Correa (:  1960) is a 65 y.o. female, here for evaluation of the following medical concerns:    HPI    Skin problem: suffered a burn on the right hand a week or more ago. Started treating with neosporin, the skin become more red and inflamed surrounding the burn. The thought it was getting infected so went to urgent care. They put her on Keflex and Bacitracin and she stopped the Neosporin. Took 4 days of the Keflex and then had some GI side effects so she stopped it. The skins was improving. She ran out of Bacitracin so started putting Neosporin on it again and then the wound started getting more red again. She realized the neosporin was making it worse, thinks she may have had a reaction from it. She discontinued that two days ago and the skin is improving. Review of Systems   Constitutional:  Negative for chills, fatigue and fever. Respiratory:  Negative for cough and shortness of breath. Cardiovascular:  Negative for chest pain and leg swelling. Gastrointestinal:  Negative for diarrhea, nausea and vomiting. Skin:  Positive for color change. Neurological:  Negative for dizziness and headaches. All other systems reviewed and are negative. Prior to Visit Medications    Medication Sig Taking? Authorizing Provider   bacitracin zinc 500 UNIT/GM ointment Apply topically 2 times daily. Yes Demond Jennings, APRN - CNP   SYNTHROID 88 MCG tablet TAKE ONE TABLET BY MOUTH DAILY Yes Rahul Aguilar DO   Hypertonic Nasal Wash (SINUS RINSE REFILL) PACK 1 packet by Nasal route 2 times daily Yes Shelly Calderon MD   fluticasone (FLONASE) 50 MCG/ACT nasal spray 1 spray by Each Nostril route in the morning and 1 spray before bedtime.  Yes Shelly Calderon MD   Cetirizine HCl (ZYRTEC ALLERGY PO)  Yes Historical Provider, MD   SYNTHROID 88 MCG tablet Take 1 tablet by mouth Daily  Nitesh Rowley MD        Social History     Tobacco Use    Smoking status: Former     Packs/day: 0.50     Years: 20.00     Pack years: 10.00     Types: Cigarettes     Quit date: 2010     Years since quittin.0    Smokeless tobacco: Never   Substance Use Topics    Alcohol use: No        Vitals:    23 1108   BP: 133/85   Site: Left Upper Arm   Position: Sitting   Pulse: 68   Resp: 16   Temp: 97.8 °F (36.6 °C)   TempSrc: Temporal   SpO2: 99%   Weight: 225 lb (102.1 kg)     Estimated body mass index is 37.44 kg/m² as calculated from the following:    Height as of 23: 5' 5\" (1.651 m). Weight as of this encounter: 225 lb (102.1 kg). Physical Exam  Vitals and nursing note reviewed. Constitutional:       General: She is not in acute distress. Appearance: Normal appearance. She is well-developed. She is not ill-appearing, toxic-appearing or diaphoretic. HENT:      Head: Normocephalic and atraumatic. Cardiovascular:      Rate and Rhythm: Normal rate and regular rhythm. Heart sounds: S1 normal and S2 normal.   Pulmonary:      Effort: Pulmonary effort is normal. No respiratory distress. Skin:            Comments: Dull red patch, skin intact. No evidence of acute infection   Neurological:      General: No focal deficit present. Mental Status: She is alert and oriented to person, place, and time. Mental status is at baseline. Cranial Nerves: No cranial nerve deficit. Psychiatric:         Speech: Speech normal.       ASSESSMENT/PLAN:  1. Superficial burn of back of right hand, subsequent encounter  -healing nicely, no evidence of acute infection    2. Adverse effect of drug that acts primarily on skin, initial encounter  - resolved since she discontinued use of Neosporin    3. Acquired hypothyroidism  - CBC; Future  - Comprehensive Metabolic Panel;  Future  - TSH with Reflex; Future    4. Screening for lipid disorders  - LIPID PANEL; Future    Return if symptoms worsen or fail to improve. An electronic signature was used to authenticate this note.     --JAMES Babin - CNP on 2/2/2023 at 12:40 PM

## 2023-07-19 ENCOUNTER — OFFICE VISIT (OUTPATIENT)
Dept: ORTHOPEDIC SURGERY | Age: 63
End: 2023-07-19

## 2023-07-19 VITALS — RESPIRATION RATE: 15 BRPM | HEIGHT: 65 IN | WEIGHT: 225 LBS | BODY MASS INDEX: 37.49 KG/M2

## 2023-07-19 DIAGNOSIS — G56.00 PARTIAL THENAR ATROPHY, UNSPECIFIED LATERALITY: Primary | ICD-10-CM

## 2023-07-19 NOTE — PROGRESS NOTES
Ms. Britton Mcgarry  returns today regarding bilateral  Dupuytren's Contracture for which she was last evaluated in November 2021. Treatment up to this time has included: No prior treatment. She has noted the size of the palmar thickening has been stable with time. She has not noted any limitation of motion of the Small Finger; She does report any discomfort about the Left greater than Right 1st web associated with her presenting concern. Due to the dynamic and progressive nature of Dupuytren's Contracture, It is clinically necessary to carefully re-evaluate Ms. Britton Mcgarry today, prior to proceeding with any further treatment or intervention, to assure the clinical appropriateness of any previously considered treatment, at this time. I have today reviewed with Britton Mcgarry the clinically relevant, past medical history, medications, allergies,  family history, social history, and Review Of Systems & I have documented any details relevant to today's presenting complaints in my history above. Ms. Rosa Taylor's self-reported past medical history, medications, allergies,  family history, social history, and Review Of Systems have been scanned into the chart under the \"Media\" tab. Physical Exam:  Vitals  Respirations: 15  Height: 5' 5\" (165.1 cm)  Weight - Scale: 225 lb (102.1 kg)  Ms. Britton Mcgarry appears well, she is in no apparent distress, she demonstrates appropriate mood & affect. Skin: Normal Color, Free of Lesions, and Clinical evidence of Dupuytren's Contracture Bilateral   There is a solitary nodule on the palm,bilaterally in the axis of the Ring Finger and Small Finger without involvement of the Ring Finger and Small Finger to the level of the MCP Joint. Finger joint range of motion is not abnormal; there is not a contracture of the bilaterally Ring Finger and Small Finger.     All other digital range of motion is unchanged from prior examination bilaterally  Wrist range of motion is

## 2023-07-19 NOTE — PATIENT INSTRUCTIONS
Thank you for choosing [facility]) Physicians for your Hand and Upper Extremity needs. If we can be of any further assistance to you, please do not hesitate to contact us.     Office Phone Number:  [phone]  or  [phone]

## 2023-08-10 ENCOUNTER — PROCEDURE VISIT (OUTPATIENT)
Dept: NEUROLOGY | Age: 63
End: 2023-08-10
Payer: COMMERCIAL

## 2023-08-10 DIAGNOSIS — G56.23 ENTRAPMENT OF BOTH ULNAR NERVES AT ELBOW: Primary | ICD-10-CM

## 2023-08-10 PROCEDURE — 95886 MUSC TEST DONE W/N TEST COMP: CPT | Performed by: PSYCHIATRY & NEUROLOGY

## 2023-08-10 PROCEDURE — 95911 NRV CNDJ TEST 9-10 STUDIES: CPT | Performed by: PSYCHIATRY & NEUROLOGY

## 2023-08-10 NOTE — PROGRESS NOTES
Nabil Augustin M.D. [facility]  Board Certified in [address], [address] [facility], [address] [facility]    EMG / NERVE CONDUCTION STUDY      PATIENT:  Ana Maria Taylor       DATE OF EM/10/23     YOB: 1960       REASON FOR EMG:   Bilateral arm numbness, left thenar muscle atrophy      REFERRING PHYSICIAN:  Lacey Morton MD  [address] [facility],  [address] [place] [address]     SUMMARY:   Bilateral median motor and sensory nerve studies were normal.  The left median motor nerve study had a slightly low amplitude which is a nonspecific finding  Bilateral ulnar motor nerve studies with slowing of conduction velocities across the elbow  Bilateral ulnar sensory nerve studies were normal  The left radial sensory nerve study was normal  Needle EMG of several muscles in both upper extremities showed evidence of decreased motor units in the left first dorsal interosseous muscle. CLINICAL DIAGNOSIS:  Neuropathy        EMG RESULTS:     This patient has bilateral ulnar nerve lesions at the elbow. The left side is moderately severe whereas the right side is very mild. There is no electrophysiological evidence for carpal tunnel syndrome in this study. ---------------------------------------------  Nabil Augustin M.D.   Electromyographer / Neurologist

## 2023-08-30 ENCOUNTER — OFFICE VISIT (OUTPATIENT)
Dept: ORTHOPEDIC SURGERY | Age: 63
End: 2023-08-30
Payer: COMMERCIAL

## 2023-08-30 VITALS — WEIGHT: 225 LBS | BODY MASS INDEX: 37.49 KG/M2 | HEIGHT: 65 IN | RESPIRATION RATE: 16 BRPM

## 2023-08-30 DIAGNOSIS — G56.22 CUBITAL TUNNEL SYNDROME ON LEFT: Primary | ICD-10-CM

## 2023-08-30 PROCEDURE — 99214 OFFICE O/P EST MOD 30 MIN: CPT | Performed by: PHYSICIAN ASSISTANT

## 2023-08-30 NOTE — PROGRESS NOTES
Ms. Servando Dinh returns today in follow-up of her previously treated  left Cubital Tunnel Syndrome. She was last seen be Dr. Deborah Nguyen. Yolanda Granda in July, 2023 at which time she was treated with electrodiagnostic studies were ordered. She experienced transient relief of her initial symptoms. She  has noticed symptom improvement over the last several weeks. She returns today with continued symptoms of left numbness in the Ulnar Innervated digits and tingling in the Ulnar  Innervated digits, requesting further treatment. I have today reviewed with Servando Dinh the clinically relevant, past medical history, medications, allergies,  family history, social history, and Review Of Systems & I have documented any details relevant to today's presenting complaints in my history above. Ms. Calixto Chirinos Marstaryn's self-reported past medical history, medications, allergies,  family history, social history, and Review Of Systems have been scanned into the chart under the \"Media\" tab. Physical Exam:  Vitals  Respirations: 16  Height: 5' 5\" (165.1 cm)  Weight - Scale: 225 lb (102.1 kg)  Ms. Servando Dinh appears well, she is in no apparent distress, she demonstrates appropriate mood & affect. Skin: Normal in appearance, Normal Color, and Free of Lesions Bilaterally   Digital range of motion is Full bilaterally  Wrist range of motion is Full bilaterally  Elbow range of motion is without significant limitation bilaterally  There is no evidence of gross joint instability bilaterally. Sensation is subjectively tingling in the Whole Hand bilaterally and objectively present in the same distribution bilaterally  Vascular examination reveals normal, good capillary refill, and good color bilaterally  Swelling is absent in the medial elbow, there is no tenderness at the medial epicondyle on the Left, normal on the Right  Examination for Cubital Tunnel Syndrome shows no tenderness to palpation at the medial epicondyle bilaterally.   The

## 2023-09-05 ENCOUNTER — TELEPHONE (OUTPATIENT)
Dept: ORTHOPEDIC SURGERY | Age: 63
End: 2023-09-05

## 2023-09-05 NOTE — TELEPHONE ENCOUNTER
Auth: NPR  Date: 9/26/2023 - 12/26/2023  Reference # Tracking ID: [id number]  Spoke with: NONE  Type of SX: OUTPATIENT  Location: [facility] [place]  CPT: 10584   DX: G56.02  SX area: [facility]   Insurance: HARSH

## 2023-09-08 ENCOUNTER — OFFICE VISIT (OUTPATIENT)
Dept: FAMILY MEDICINE CLINIC | Age: 63
End: 2023-09-08
Payer: COMMERCIAL

## 2023-09-08 VITALS
SYSTOLIC BLOOD PRESSURE: 128 MMHG | BODY MASS INDEX: 35.16 KG/M2 | DIASTOLIC BLOOD PRESSURE: 88 MMHG | OXYGEN SATURATION: 97 % | WEIGHT: 211 LBS | TEMPERATURE: 97.1 F | RESPIRATION RATE: 16 BRPM | HEIGHT: 65 IN | HEART RATE: 68 BPM

## 2023-09-08 DIAGNOSIS — Z01.818 PREOPERATIVE EXAMINATION: Primary | ICD-10-CM

## 2023-09-08 DIAGNOSIS — E03.8 HYPOTHYROIDISM DUE TO HASHIMOTO'S THYROIDITIS: ICD-10-CM

## 2023-09-08 DIAGNOSIS — E06.3 HYPOTHYROIDISM DUE TO HASHIMOTO'S THYROIDITIS: ICD-10-CM

## 2023-09-08 DIAGNOSIS — G56.22 CUBITAL TUNNEL SYNDROME ON LEFT: ICD-10-CM

## 2023-09-08 PROCEDURE — 93000 ELECTROCARDIOGRAM COMPLETE: CPT | Performed by: STUDENT IN AN ORGANIZED HEALTH CARE EDUCATION/TRAINING PROGRAM

## 2023-09-08 PROCEDURE — 99214 OFFICE O/P EST MOD 30 MIN: CPT | Performed by: STUDENT IN AN ORGANIZED HEALTH CARE EDUCATION/TRAINING PROGRAM

## 2023-09-08 SDOH — ECONOMIC STABILITY: FOOD INSECURITY: WITHIN THE PAST 12 MONTHS, YOU WORRIED THAT YOUR FOOD WOULD RUN OUT BEFORE YOU GOT MONEY TO BUY MORE.: NEVER TRUE

## 2023-09-08 SDOH — ECONOMIC STABILITY: INCOME INSECURITY: HOW HARD IS IT FOR YOU TO PAY FOR THE VERY BASICS LIKE FOOD, HOUSING, MEDICAL CARE, AND HEATING?: NOT HARD AT ALL

## 2023-09-08 SDOH — ECONOMIC STABILITY: FOOD INSECURITY: WITHIN THE PAST 12 MONTHS, THE FOOD YOU BOUGHT JUST DIDN'T LAST AND YOU DIDN'T HAVE MONEY TO GET MORE.: NEVER TRUE

## 2023-09-08 ASSESSMENT — PATIENT HEALTH QUESTIONNAIRE - PHQ9
SUM OF ALL RESPONSES TO PHQ QUESTIONS 1-9: 0
1. LITTLE INTEREST OR PLEASURE IN DOING THINGS: 0
SUM OF ALL RESPONSES TO PHQ9 QUESTIONS 1 & 2: 0
2. FEELING DOWN, DEPRESSED OR HOPELESS: 0
SUM OF ALL RESPONSES TO PHQ QUESTIONS 1-9: 0

## 2023-09-08 NOTE — PROGRESS NOTES
Preoperative Consultation- [address]  YOB: 1960    Date of Service:  9/8/2023    Chief Complaint   Patient presents with    Pre-op Exam     9/26/2023 Left Elbow [address]      Patient is a 65 y.o. female  has a past medical history of Acquired hypothyroidism, who presents for a preoperative evaluation for the following     Procedure: LEFT ULNAR NERVE DECOMPRESSION (AT ELBOW)  Surgeon: Patricia Angel MD  Date Of Surgery:  9/26/23  Location: [facility]  Diagnosis: Cubital tunnel syndrome  Symptoms have been present for years, endorses pain in palm of the left hand and muscle atrophy. Has associated finger numbness, in 4th and 5th digits. Planned anesthesia: General   Known anesthesia problems: None   Bleeding risk: No recent or remote history of abnormal bleeding  Personal or FH of DVT/PE: No    Patient objection to receiving blood products: No    No history of hypertension, hyperlipidemia, diabetes or CAD. Chronic conditions   Patient is currently only on Synthroid 88 mcg daily for hypothyroidism. Denies any acute complaints today. No recent illnesses. Currently non-smoker, denies any alcohol use. No use of illicit drugs. I have reviewed the allergies, medications, medical history, and family medical history below.      Allergies   Allergen Reactions    Keflex [Cephalexin] Diarrhea     Abdominal cramping, diarrhea     Outpatient Medications Marked as Taking for the 9/8/23 encounter (Office Visit) with Bebe Menendez MD   Medication Sig Dispense Refill    SYNTHROID 88 MCG tablet TAKE ONE TABLET BY MOUTH DAILY 90 tablet 3     Past Medical History:   Diagnosis Date    Acquired hypothyroidism 2/1/2017    Foreign body, hand, superficial, left, initial encounter 7/16/2019    Hyperthyroidism     Morbid obesity ([address]) 2/1/2017     Past Surgical History:   Procedure Laterality Date    BACK SURGERY  2010     Family History

## 2023-09-08 NOTE — PATIENT INSTRUCTIONS
PRE OP INSTRUCTION SHEET  - Do not eat or drink anything after 12 midnight  prior to surgery. This includes no water, chewing gum or mints. - Hold morning medications   - Aspirin, Ibuprofen, Advil, Naproxen, Vitamin E, fish oil and other Anti-inflammatory products should be stopped for 7 days before surgery or as directed by your physician. -  Do not smoke, and do not drink any alcoholic beverages 24 hours prior to surgery.    -You may brush your teeth and gargle the morning of surgery.

## 2023-09-13 ENCOUNTER — HOSPITAL ENCOUNTER (OUTPATIENT)
Dept: MAMMOGRAPHY | Age: 63
Discharge: HOME | End: 2023-09-13
Payer: COMMERCIAL

## 2023-09-13 DIAGNOSIS — E03.9 ACQUIRED HYPOTHYROIDISM: ICD-10-CM

## 2023-09-13 DIAGNOSIS — Z12.31 BREAST CANCER SCREENING BY MAMMOGRAM: ICD-10-CM

## 2023-09-13 DIAGNOSIS — Z13.220 SCREENING FOR LIPID DISORDERS: ICD-10-CM

## 2023-09-13 LAB
ALBUMIN SERPL-MCNC: 4.5 G/DL (ref 3.4–5)
ALBUMIN/GLOB SERPL: 1.7 {RATIO} (ref 1.1–2.2)
ALP SERPL-CCNC: 98 U/L (ref 40–129)
ALT SERPL-CCNC: 15 U/L (ref 10–40)
ANION GAP SERPL CALCULATED.3IONS-SCNC: 10 MMOL/L (ref 3–16)
AST SERPL-CCNC: 13 U/L (ref 15–37)
BILIRUB SERPL-MCNC: 0.3 MG/DL (ref 0–1)
BUN SERPL-MCNC: 15 MG/DL (ref 7–20)
CALCIUM SERPL-MCNC: 9 MG/DL (ref 8.3–10.6)
CHLORIDE SERPL-SCNC: 106 MMOL/L (ref 99–110)
CHOLEST SERPL-MCNC: 179 MG/DL (ref 0–199)
CO2 SERPL-SCNC: 23 MMOL/L (ref 21–32)
CREAT SERPL-MCNC: 1.2 MG/DL (ref 0.6–1.2)
DEPRECATED RDW RBC AUTO: 13.3 % (ref 12.4–15.4)
GFR SERPLBLD CREATININE-BSD FMLA CKD-EPI: 51 ML/MIN/{1.73_M2}
GLUCOSE SERPL-MCNC: 97 MG/DL (ref 70–99)
HCT VFR BLD AUTO: 43.7 % (ref 36–48)
HDLC SERPL-MCNC: 56 MG/DL (ref 40–60)
HGB BLD-MCNC: 14.9 G/DL (ref 12–16)
LDLC SERPL CALC-MCNC: 100 MG/DL
MCH RBC QN AUTO: 30.1 PG (ref 26–34)
MCHC RBC AUTO-ENTMCNC: 34.1 G/DL (ref 31–36)
MCV RBC AUTO: 88.3 FL (ref 80–100)
PLATELET # BLD AUTO: 231 K/UL (ref 135–450)
PMV BLD AUTO: 7.7 FL (ref 5–10.5)
POTASSIUM SERPL-SCNC: 4.6 MMOL/L (ref 3.5–5.1)
PROT SERPL-MCNC: 7.2 G/DL (ref 6.4–8.2)
RBC # BLD AUTO: 4.95 M/UL (ref 4–5.2)
SODIUM SERPL-SCNC: 139 MMOL/L (ref 136–145)
T4 FREE SERPL-MCNC: 1.3 NG/DL (ref 0.9–1.8)
TRIGL SERPL-MCNC: 114 MG/DL (ref 0–150)
TSH SERPL DL<=0.005 MIU/L-ACNC: 5.3 UIU/ML (ref 0.27–4.2)
VLDLC SERPL CALC-MCNC: 23 MG/DL
WBC # BLD AUTO: 5.4 K/UL (ref 4–11)

## 2023-09-13 PROCEDURE — 77063 BREAST TOMOSYNTHESIS BI: CPT

## 2023-09-15 DIAGNOSIS — E03.9 ACQUIRED HYPOTHYROIDISM: ICD-10-CM

## 2023-09-15 RX ORDER — LEVOTHYROXINE SODIUM 0.1 MG/1
100 TABLET ORAL DAILY
Qty: 90 TABLET | Refills: 3 | OUTPATIENT
Start: 2023-09-15

## 2023-09-15 RX ORDER — LEVOTHYROXINE SODIUM 100 MCG
100 TABLET ORAL DAILY
Qty: 90 TABLET | Refills: 1 | Status: SHIPPED | OUTPATIENT
Start: 2023-09-15

## 2023-09-15 RX ORDER — LEVOTHYROXINE SODIUM 0.1 MG/1
100 TABLET ORAL DAILY
Qty: 90 TABLET | Refills: 3 | Status: SHIPPED | OUTPATIENT
Start: 2023-09-15 | End: 2023-09-15

## 2023-09-15 NOTE — TELEPHONE ENCOUNTER
Meng Sagastume with [facility] called because the Rx for Synthroid that was sent over today was CATHY for the generic. Pt states she can only take brand.      Please send new Rx

## 2023-09-19 NOTE — PROGRESS NOTES
Date and time of surgery :     9/26/23 at 1         Arrival Time:  630     Bring Picture ID and insurance card. Please wear simple, loose fitting clothing to the hospital.   Arun Flores not bring valuables (money, credit cards, checkbooks, etc.)   Do not wear any makeup (including  eye makeup) and no nail polish or artificial nails on your fingers or toes. DO NOT wear any jewelry or piercings on day of surgery. All body piercing jewelry must be removed. If you have dentures, they will be removed before going to the OR; we will provide you a container. If you wear contact lenses or glasses, they will be removed; please bring a case for them. Shower the evening before or morning of surgery with antibacterial soap. Nothing to eat or drink after midnight the day before surgery. You may brush your teeth and gargle the morning of surgery. DO NOT SWALLOW WATER. Do not take any morning meds the day of your surgery. Aspirin, Ibuprofen, Advil, Naproxen, Vitamin E and other Anti-inflammatory products and supplements should be stopped for 5 -7days before surgery or as directed by your physician. Do not smoke or drink any alcoholic beverages 24 hours prior to surgery. This includes NA Beer. Refrain from the usage of any recreational drugs, including non-prescribed prescription drugs. You MUST plan for a responsible adult to stay on site while you are here and take you home after your surgery. You will not be allowed to leave alone or drive yourself home. It is strongly suggested someone stay with you the first 24 hrs. Your surgery will be cancelled if you do not have a ride home. To help prevent infection, change your sheets the night before surgery. If you  have a Living Will and Durable Power of  for Healthcare, please bring in a copy. Notify your Surgeon if you develop any illness between now and time of surgery.  Cough, cold, fever, sore throat, nausea, vomiting, etc.  Please notify your surgeon if you

## 2023-09-19 NOTE — PROGRESS NOTES
Edward Sans    Age 65 y.o.    female    1960    MRN [id number]    9/26/2023  Arrival Time_____________  OR Time____________[address]     Procedure(s):  LEFT ULNAR NERVE DECOMPRESSION (AT ELBOW)                      General    Surgeon(s):  Rahel Miranda, MD       Phone [phone] (home) [phone] (work)    [facility]  Cell         Work  _____________________________________________________________________  _____________________________________________________________________  _____________________________________________________________________  _____________________________________________________________________  _____________________________________________________________________    PCP _____________________________ Phone_________________     H&P__________________Bringing      Chart            Epic   DOS      Called________  EKG__________________Bringing      Chart            Epic   DOS      Called________  LAB__________________ Bringing      Chart            Epic   DOS      Called________  Cardiac Clearance_______Bringing      Chart            Epic      DOS      Called________    Cardiologist________________________ Phone___________________________    ? Adventism concerns / Waiver on Chart            [employer]________________  ? Pre-op Instructions Given [address]          _________________________________  ? Directions to Surgery Center                          _________________________________  ? Transportation Home_______________      __________________________________  ?  Crutches/Walker__________________        __________________________________    ________Pre-op Orders   _______Transcribed    _______Wt.  ________Pharmacy          _______SCD  ______VTE     ______TED Elie Schooling  _______  Surgery Consent    _______  Anesthesia Consent         COVID DATE______________LOCATION________________ RESULT__________

## 2023-09-25 ENCOUNTER — ANESTHESIA EVENT (OUTPATIENT)
Dept: OPERATING ROOM | Age: 63
End: 2023-09-25
Payer: COMMERCIAL

## 2023-09-26 ENCOUNTER — ANESTHESIA (OUTPATIENT)
Dept: OPERATING ROOM | Age: 63
End: 2023-09-26
Payer: COMMERCIAL

## 2023-09-26 ENCOUNTER — HOSPITAL ENCOUNTER (OUTPATIENT)
Age: 63
Setting detail: OUTPATIENT SURGERY
Discharge: HOME | End: 2023-09-26
Attending: ORTHOPAEDIC SURGERY | Admitting: ORTHOPAEDIC SURGERY
Payer: COMMERCIAL

## 2023-09-26 VITALS
WEIGHT: 209 LBS | HEART RATE: 57 BPM | DIASTOLIC BLOOD PRESSURE: 41 MMHG | OXYGEN SATURATION: 96 % | HEIGHT: 65 IN | TEMPERATURE: 97.1 F | RESPIRATION RATE: 16 BRPM | BODY MASS INDEX: 34.82 KG/M2 | SYSTOLIC BLOOD PRESSURE: 145 MMHG

## 2023-09-26 PROBLEM — G56.02 CARPAL TUNNEL SYNDROME ON LEFT: Status: ACTIVE | Noted: 2023-09-26

## 2023-09-26 PROCEDURE — 2580000003 HC RX 258: Performed by: ANESTHESIOLOGY

## 2023-09-26 PROCEDURE — 3700000001 HC ADD 15 MINUTES (ANESTHESIA): Performed by: ORTHOPAEDIC SURGERY

## 2023-09-26 PROCEDURE — 7100000010 HC PHASE II RECOVERY - FIRST 15 MIN: Performed by: ORTHOPAEDIC SURGERY

## 2023-09-26 PROCEDURE — 2500000003 HC RX 250 WO HCPCS: Performed by: NURSE ANESTHETIST, CERTIFIED REGISTERED

## 2023-09-26 PROCEDURE — 6360000002 HC RX W HCPCS: Performed by: NURSE ANESTHETIST, CERTIFIED REGISTERED

## 2023-09-26 PROCEDURE — A4217 STERILE WATER/SALINE, 500 ML: HCPCS | Performed by: ORTHOPAEDIC SURGERY

## 2023-09-26 PROCEDURE — 3600000003 HC SURGERY LEVEL 3 BASE: Performed by: ORTHOPAEDIC SURGERY

## 2023-09-26 PROCEDURE — 3600000013 HC SURGERY LEVEL 3 ADDTL 15MIN: Performed by: ORTHOPAEDIC SURGERY

## 2023-09-26 PROCEDURE — 7100000011 HC PHASE II RECOVERY - ADDTL 15 MIN: Performed by: ORTHOPAEDIC SURGERY

## 2023-09-26 PROCEDURE — 7100000000 HC PACU RECOVERY - FIRST 15 MIN: Performed by: ORTHOPAEDIC SURGERY

## 2023-09-26 PROCEDURE — 6360000002 HC RX W HCPCS: Performed by: ORTHOPAEDIC SURGERY

## 2023-09-26 PROCEDURE — 2580000003 HC RX 258: Performed by: ORTHOPAEDIC SURGERY

## 2023-09-26 PROCEDURE — 7100000001 HC PACU RECOVERY - ADDTL 15 MIN: Performed by: ORTHOPAEDIC SURGERY

## 2023-09-26 PROCEDURE — 2709999900 HC NON-CHARGEABLE SUPPLY: Performed by: ORTHOPAEDIC SURGERY

## 2023-09-26 PROCEDURE — 3700000000 HC ANESTHESIA ATTENDED CARE: Performed by: ORTHOPAEDIC SURGERY

## 2023-09-26 RX ORDER — SODIUM CHLORIDE 9 MG/ML
INJECTION, SOLUTION INTRAVENOUS PRN
Status: DISCONTINUED | OUTPATIENT
Start: 2023-09-26 | End: 2023-09-26 | Stop reason: HOSPADM

## 2023-09-26 RX ORDER — ONDANSETRON 2 MG/ML
4 INJECTION INTRAMUSCULAR; INTRAVENOUS
Status: CANCELLED | OUTPATIENT
Start: 2023-09-26

## 2023-09-26 RX ORDER — SODIUM CHLORIDE, SODIUM LACTATE, POTASSIUM CHLORIDE, CALCIUM CHLORIDE 600; 310; 30; 20 MG/100ML; MG/100ML; MG/100ML; MG/100ML
INJECTION, SOLUTION INTRAVENOUS CONTINUOUS
Status: DISCONTINUED | OUTPATIENT
Start: 2023-09-26 | End: 2023-09-26 | Stop reason: HOSPADM

## 2023-09-26 RX ORDER — PROPOFOL 10 MG/ML
INJECTION, EMULSION INTRAVENOUS PRN
Status: DISCONTINUED | OUTPATIENT
Start: 2023-09-26 | End: 2023-09-26 | Stop reason: SDUPTHER

## 2023-09-26 RX ORDER — OXYCODONE HYDROCHLORIDE 5 MG/1
5 TABLET ORAL PRN
Status: CANCELLED | OUTPATIENT
Start: 2023-09-26 | End: 2023-09-26

## 2023-09-26 RX ORDER — SODIUM CHLORIDE 0.9 % (FLUSH) 0.9 %
5-40 SYRINGE (ML) INJECTION EVERY 12 HOURS SCHEDULED
Status: CANCELLED | OUTPATIENT
Start: 2023-09-26

## 2023-09-26 RX ORDER — OXYCODONE HYDROCHLORIDE 5 MG/1
10 TABLET ORAL PRN
Status: CANCELLED | OUTPATIENT
Start: 2023-09-26 | End: 2023-09-26

## 2023-09-26 RX ORDER — DEXAMETHASONE SODIUM PHOSPHATE 10 MG/ML
INJECTION INTRAMUSCULAR; INTRAVENOUS PRN
Status: DISCONTINUED | OUTPATIENT
Start: 2023-09-26 | End: 2023-09-26 | Stop reason: SDUPTHER

## 2023-09-26 RX ORDER — FENTANYL CITRATE 50 UG/ML
INJECTION, SOLUTION INTRAMUSCULAR; INTRAVENOUS PRN
Status: DISCONTINUED | OUTPATIENT
Start: 2023-09-26 | End: 2023-09-26 | Stop reason: SDUPTHER

## 2023-09-26 RX ORDER — ATROPINE SULFATE 0.4 MG/ML
INJECTION, SOLUTION INTRAMUSCULAR; INTRAVENOUS; SUBCUTANEOUS PRN
Status: DISCONTINUED | OUTPATIENT
Start: 2023-09-26 | End: 2023-09-26 | Stop reason: SDUPTHER

## 2023-09-26 RX ORDER — ONDANSETRON 2 MG/ML
INJECTION INTRAMUSCULAR; INTRAVENOUS PRN
Status: DISCONTINUED | OUTPATIENT
Start: 2023-09-26 | End: 2023-09-26 | Stop reason: SDUPTHER

## 2023-09-26 RX ORDER — LIDOCAINE HYDROCHLORIDE 20 MG/ML
INJECTION, SOLUTION EPIDURAL; INFILTRATION; INTRACAUDAL; PERINEURAL PRN
Status: DISCONTINUED | OUTPATIENT
Start: 2023-09-26 | End: 2023-09-26 | Stop reason: SDUPTHER

## 2023-09-26 RX ORDER — DIPHENHYDRAMINE HYDROCHLORIDE 50 MG/ML
12.5 INJECTION INTRAMUSCULAR; INTRAVENOUS
Status: CANCELLED | OUTPATIENT
Start: 2023-09-26 | End: 2023-09-27

## 2023-09-26 RX ORDER — SODIUM CHLORIDE 0.9 % (FLUSH) 0.9 %
5-40 SYRINGE (ML) INJECTION PRN
Status: DISCONTINUED | OUTPATIENT
Start: 2023-09-26 | End: 2023-09-26 | Stop reason: HOSPADM

## 2023-09-26 RX ORDER — LABETALOL HYDROCHLORIDE 5 MG/ML
5 INJECTION, SOLUTION INTRAVENOUS EVERY 10 MIN PRN
Status: CANCELLED | OUTPATIENT
Start: 2023-09-26

## 2023-09-26 RX ORDER — SODIUM CHLORIDE 0.9 % (FLUSH) 0.9 %
5-40 SYRINGE (ML) INJECTION PRN
Status: CANCELLED | OUTPATIENT
Start: 2023-09-26

## 2023-09-26 RX ORDER — BUPIVACAINE HYDROCHLORIDE 5 MG/ML
INJECTION, SOLUTION EPIDURAL; INTRACAUDAL PRN
Status: DISCONTINUED | OUTPATIENT
Start: 2023-09-26 | End: 2023-09-26 | Stop reason: ALTCHOICE

## 2023-09-26 RX ORDER — SODIUM CHLORIDE 0.9 % (FLUSH) 0.9 %
5-40 SYRINGE (ML) INJECTION EVERY 12 HOURS SCHEDULED
Status: DISCONTINUED | OUTPATIENT
Start: 2023-09-26 | End: 2023-09-26 | Stop reason: HOSPADM

## 2023-09-26 RX ORDER — LIDOCAINE HYDROCHLORIDE 10 MG/ML
0.3 INJECTION, SOLUTION EPIDURAL; INFILTRATION; INTRACAUDAL; PERINEURAL
Status: DISCONTINUED | OUTPATIENT
Start: 2023-09-26 | End: 2023-09-26 | Stop reason: HOSPADM

## 2023-09-26 RX ORDER — SODIUM CHLORIDE 9 MG/ML
INJECTION, SOLUTION INTRAVENOUS PRN
Status: CANCELLED | OUTPATIENT
Start: 2023-09-26

## 2023-09-26 RX ORDER — MAGNESIUM HYDROXIDE 1200 MG/15ML
LIQUID ORAL CONTINUOUS PRN
Status: COMPLETED | OUTPATIENT
Start: 2023-09-26 | End: 2023-09-26

## 2023-09-26 RX ADMIN — ONDANSETRON 4 MG: 2 INJECTION INTRAMUSCULAR; INTRAVENOUS at 08:37

## 2023-09-26 RX ADMIN — DEXAMETHASONE SODIUM PHOSPHATE 10 MG: 10 INJECTION INTRAMUSCULAR; INTRAVENOUS at 08:37

## 2023-09-26 RX ADMIN — SODIUM CHLORIDE, POTASSIUM CHLORIDE, SODIUM LACTATE AND CALCIUM CHLORIDE: 600; 310; 30; 20 INJECTION, SOLUTION INTRAVENOUS at 06:48

## 2023-09-26 RX ADMIN — PROPOFOL 160 MG: 10 INJECTION, EMULSION INTRAVENOUS at 08:31

## 2023-09-26 RX ADMIN — FENTANYL CITRATE 25 MCG: 50 INJECTION, SOLUTION INTRAMUSCULAR; INTRAVENOUS at 08:31

## 2023-09-26 RX ADMIN — PROPOFOL 40 MG: 10 INJECTION, EMULSION INTRAVENOUS at 08:34

## 2023-09-26 RX ADMIN — FENTANYL CITRATE 50 MCG: 50 INJECTION, SOLUTION INTRAMUSCULAR; INTRAVENOUS at 08:34

## 2023-09-26 RX ADMIN — LIDOCAINE HYDROCHLORIDE 60 MG: 20 INJECTION, SOLUTION EPIDURAL; INFILTRATION; INTRACAUDAL; PERINEURAL at 08:31

## 2023-09-26 RX ADMIN — ATROPINE SULFATE 0.4 MG: 0.4 INJECTION, SOLUTION INTRAMUSCULAR; INTRAVENOUS; SUBCUTANEOUS at 08:43

## 2023-09-26 ASSESSMENT — PAIN SCALES - GENERAL
PAINLEVEL_OUTOF10: 0

## 2023-09-26 ASSESSMENT — PAIN - FUNCTIONAL ASSESSMENT: PAIN_FUNCTIONAL_ASSESSMENT: 0-10

## 2023-09-26 NOTE — DISCHARGE INSTRUCTIONS
Post-Operative Instructions    Ulnar Nerve Release:    Keep hand elevated with fingers above eye-level to control swelling. Keep hand and bandage clean and dry. Do not change or unwrap bandage. Please leave bandage in place until your follow-up appointment. Maintain finger motion by fully straightening and fully bending fingers and thumb at least once an hour (while awake). This may cause some discomfort, but will not damage surgery. You may use your operated hand for lightweight tasks (e.g. writing, eating, dressing, etc.). NO LIFTING, CARRYING OR HEAVY USE. Most Patients do not have \"Serious Pain\" after this procedure and thus most patients do not require prescription pain medication. You may take over the counter medication (Tylenol, Advil, Aleve, etc.) as needed. If you are unable to tolerate the discomfort after your surgery and the OTC medications do not provide some relief, you may contact our office to discuss other options. .    Please call the office at [phone]  in 24 - 48 hours to schedule a follow up appointment for approximately 7 - 10 days after surgery. Please call the office at [phone]  if you have any questions or problems. Kailee Garcia MD      ANESTHESIA DISCHARGE INSTRUCTIONS    You are under the influence of drugs- do not drink alcohol, drive a car, operate machinery(such as power tools, kitchen appliances, etc), sign legal documents, or make any important decisions for 24 hours (or while on pain medications). Children should not ride bikes or [place] or play on gym sets  for 24 hours after surgery. A responsible adult should be with you for 24 hours. Rest at home today- increase activity as tolerated. Progress slowly to a regular diet unless your physician has instructed you otherwise. Drink plenty of water. CALL YOUR DOCTOR IF YOU:  Have moderate to severe nausea or vomiting AND are unable to hold down fluids or prescribed medications.   Have

## 2023-09-26 NOTE — OP NOTE
OPERATIVE REPORT          Patient:  Servando Dinh    YOB: 1960  Date of Service:  9/26/2023    Location:  [facility]      Preoperative Diagnosis:   Left Ulnar Nerve entrapment at the Cubital Tunnel    Postoperative Diagnosis:  Same    Procedure:   Left Ulnar Nerve decompression & Cubital Tunnel Release    Surgeon:    Deborah Nguyen. Yolanda Granda MD    Surgical Assistant:    Lenny Rider Assistant    Anesthesia:  General          Blood Loss:  Minimal    Complications:  None       Tourniquet Time: 4 minutes. Indications:  Ms. Servando Dinh  is a 65y.o. year old female with entrapment of her Left ulnar nerve at the elbow. I have discussed preoperatively with Ms. Ana Maria Taylor  the complications, limitations, expectations, alternatives and risks of surgical care, which she understood. Ms. Servando Dinh  has provided written informed consent to proceed. After written consent was obtained and the proper operative site identified and marked, Ms. Servando Dinh was brought to the operating room and placed in the supine position on the operating room table. The Left arm was extended on a hand table & the Left upper extremity was prepped and draped in the usual sterile fashion. After Esmarch exsanguination the pneumo-tourniquet was inflated about the upper arm to 250 millimeters of mercury. A curvilinear incision was fashioned over the posterior medial aspect of the elbow centered between the medial epicondyle and the tip of the olecranon. Dissection was carried carefully through the subcutaneous tissue identifying and protecting the superficial neurovascular structures. The cubital tunnel was identified and cleared of overlying soft tissue. Careful incision allowed exposure of the ulnar nerve. The nerve was traced proximally and circumferential control of the nerve was obtained.  It was dissected proximally to the level of the connection between the biceps and triceps muscles, approximately 3 cm

## 2023-09-26 NOTE — H&P
Pre-operative Update of H&P:    I  have seen & examined Ms. Roger Campuzano related solely to her hand and upper extremity conditions, prior to the scheduled procedure on the date of her surgery. The indications for the planned surgical procedure & and her upper-extremity condition are unchanged.

## 2023-09-26 NOTE — ANESTHESIA POSTPROCEDURE EVALUATION
09/13/2023 08:49 AM   RENAL  Lab Results       Component                Value               Date/Time                  NA                       139                 09/13/2023 08:49 AM        K                        4.6                 09/13/2023 08:49 AM        CL                       106                 09/13/2023 08:49 AM        CO2                      23                  09/13/2023 08:49 AM        BUN                      15                  09/13/2023 08:49 AM        CREATININE               1.2                 09/13/2023 08:49 AM        GLUCOSE                  97                  09/13/2023 08:49 AM   COAGS  No results found for: \"PROTIME\", \"INR\", \"APTT\"    Intake & Output:  @24HRIO@    Nausea & Vomiting:  No    Level of Consciousness:  Awake    Pain Assessment:  Adequate analgesia    Anesthesia Complications:  No apparent anesthetic complications    SUMMARY      Vital signs stable  OK to discharge from Stage I post anesthesia care.   Care transferred from Anesthesiology department on discharge from perioperative area

## 2023-10-04 ENCOUNTER — OFFICE VISIT (OUTPATIENT)
Dept: ORTHOPEDIC SURGERY | Age: 63
End: 2023-10-04

## 2023-10-04 VITALS — HEIGHT: 65 IN | WEIGHT: 209 LBS | BODY MASS INDEX: 34.82 KG/M2 | RESPIRATION RATE: 16 BRPM

## 2023-10-04 DIAGNOSIS — G56.22 CUBITAL TUNNEL SYNDROME ON LEFT: Primary | ICD-10-CM

## 2023-10-04 PROCEDURE — 99024 POSTOP FOLLOW-UP VISIT: CPT | Performed by: ORTHOPAEDIC SURGERY

## 2023-10-04 NOTE — PROGRESS NOTES
Ms. Melissa Gaona returns today in follow-up of her recent left First Dorsal Compartment (DeQuarvain's) Release done approximately 2 weeks ago. She has done well noting mild discomfort and no other reported complications. She notes pre-operative symptoms to be completely resolved at this time. Physical Exam:  Bandage intact and well cared for   Skin incision is healing well, without erythema, drainage or sign of infection. Digital range of motion is without significant limitation. Wrist range of motion is without significant limitation. Sensation is normal in the Ulnar Innervated Digits. Vascular examination reveals normal, good capillary refill, and good color. Swelling is minimal.  There is little residual discomfort at the First Dorsal Compartment. Impression:  Ms. Melissa Gaona is doing well after recent left DeQuarvain's Release. Plan:  Ms. Melissa Gaona is instructed in work on Active & Passive range of motion of the digits, wrist, & elbow. These modalities were specifically demonstrated to her today. We discussed the appropriateness of gradual resumption of use of the operated hand and the return to normal use as comfort allows. She is given instructions regarding management of the fresh surgical incision and progressive use of desensitization and tissue massage techniques. We discussed the appropriate expectations and timeline for symptom improvement. She is provided a written patient instruction sheet titled: Postoperative Instructions After DeQuarvain's Release. I have asked Ms. Ana Maria Taylor to follow-up with me or contact me by telephone over the next 2-4 weeks if her symptoms have not fully resolved or if she has not regained full & painless return of function. She is also specifically instructed to return to the office or call for an appointment sooner if her symptoms are changing or worsening prior to that time.

## 2024-01-23 ENCOUNTER — OFFICE VISIT (OUTPATIENT)
Dept: FAMILY MEDICINE CLINIC | Age: 64
End: 2024-01-23
Payer: COMMERCIAL

## 2024-01-23 VITALS
RESPIRATION RATE: 16 BRPM | SYSTOLIC BLOOD PRESSURE: 120 MMHG | OXYGEN SATURATION: 98 % | HEART RATE: 61 BPM | BODY MASS INDEX: 33.95 KG/M2 | TEMPERATURE: 97.1 F | WEIGHT: 204 LBS | DIASTOLIC BLOOD PRESSURE: 74 MMHG

## 2024-01-23 DIAGNOSIS — J01.10 ACUTE NON-RECURRENT FRONTAL SINUSITIS: Primary | ICD-10-CM

## 2024-01-23 PROCEDURE — 99213 OFFICE O/P EST LOW 20 MIN: CPT | Performed by: NURSE PRACTITIONER

## 2024-01-23 RX ORDER — AMOXICILLIN AND CLAVULANATE POTASSIUM 875; 125 MG/1; MG/1
1 TABLET, FILM COATED ORAL 2 TIMES DAILY
Qty: 20 TABLET | Refills: 0 | Status: SHIPPED | OUTPATIENT
Start: 2024-01-23 | End: 2024-02-02

## 2024-01-23 SDOH — ECONOMIC STABILITY: FOOD INSECURITY: WITHIN THE PAST 12 MONTHS, THE FOOD YOU BOUGHT JUST DIDN'T LAST AND YOU DIDN'T HAVE MONEY TO GET MORE.: NEVER TRUE

## 2024-01-23 SDOH — ECONOMIC STABILITY: FOOD INSECURITY: WITHIN THE PAST 12 MONTHS, YOU WORRIED THAT YOUR FOOD WOULD RUN OUT BEFORE YOU GOT MONEY TO BUY MORE.: NEVER TRUE

## 2024-01-23 SDOH — ECONOMIC STABILITY: INCOME INSECURITY: HOW HARD IS IT FOR YOU TO PAY FOR THE VERY BASICS LIKE FOOD, HOUSING, MEDICAL CARE, AND HEATING?: NOT HARD AT ALL

## 2024-01-23 ASSESSMENT — ANXIETY QUESTIONNAIRES
IF YOU CHECKED OFF ANY PROBLEMS ON THIS QUESTIONNAIRE, HOW DIFFICULT HAVE THESE PROBLEMS MADE IT FOR YOU TO DO YOUR WORK, TAKE CARE OF THINGS AT HOME, OR GET ALONG WITH OTHER PEOPLE: NOT DIFFICULT AT ALL
GAD7 TOTAL SCORE: 0
6. BECOMING EASILY ANNOYED OR IRRITABLE: 0
2. NOT BEING ABLE TO STOP OR CONTROL WORRYING: 0
3. WORRYING TOO MUCH ABOUT DIFFERENT THINGS: 0
7. FEELING AFRAID AS IF SOMETHING AWFUL MIGHT HAPPEN: 0
4. TROUBLE RELAXING: 0
5. BEING SO RESTLESS THAT IT IS HARD TO SIT STILL: 0
1. FEELING NERVOUS, ANXIOUS, OR ON EDGE: 0

## 2024-01-23 ASSESSMENT — ENCOUNTER SYMPTOMS
SORE THROAT: 0
RHINORRHEA: 1
SHORTNESS OF BREATH: 0
HOARSE VOICE: 0
SINUS PAIN: 1
SINUS PRESSURE: 1
COUGH: 1

## 2024-01-23 ASSESSMENT — PATIENT HEALTH QUESTIONNAIRE - PHQ9
SUM OF ALL RESPONSES TO PHQ QUESTIONS 1-9: 0
2. FEELING DOWN, DEPRESSED OR HOPELESS: 0
SUM OF ALL RESPONSES TO PHQ QUESTIONS 1-9: 0
1. LITTLE INTEREST OR PLEASURE IN DOING THINGS: 0
SUM OF ALL RESPONSES TO PHQ QUESTIONS 1-9: 0
SUM OF ALL RESPONSES TO PHQ QUESTIONS 1-9: 0
DEPRESSION UNABLE TO ASSESS: FUNCTIONAL CAPACITY MOTIVATION LIMITS ACCURACY
SUM OF ALL RESPONSES TO PHQ9 QUESTIONS 1 & 2: 0

## 2024-01-23 NOTE — PROGRESS NOTES
2024     Chief Complaint   Patient presents with    Sinusitis    Dizziness     Ana Maria Taylor (:  1960) is a 63 y.o. female, here for evaluation of the following medical concerns:    Sinusitis  This is a new problem. The current episode started 1 to 4 weeks ago. The problem is unchanged. Associated symptoms include congestion, coughing, ear pain, headaches and sinus pressure. Pertinent negatives include no chills, diaphoresis, hoarse voice, neck pain, shortness of breath, sneezing, sore throat or swollen glands. (Low grade temp last week) Treatments tried: Netti pot. The treatment provided no relief.     Review of Systems   Constitutional:  Negative for chills and diaphoresis.   HENT:  Positive for congestion, ear pain, postnasal drip, rhinorrhea, sinus pressure and sinus pain. Negative for dental problem, drooling, ear discharge, hoarse voice, sneezing and sore throat.    Respiratory:  Positive for cough. Negative for shortness of breath.    Musculoskeletal:  Negative for neck pain.   Neurological:  Positive for headaches.     Prior to Visit Medications    Medication Sig Taking? Authorizing Provider   amoxicillin-clavulanate (AUGMENTIN) 875-125 MG per tablet Take 1 tablet by mouth 2 times daily for 10 days Yes Breann Blackman APRN - CNP   SYNTHROID 100 MCG tablet Take 1 tablet by mouth Daily Yes Breann Blackman APRN - CNP        Social History     Tobacco Use    Smoking status: Former     Current packs/day: 0.00     Average packs/day: 0.5 packs/day for 20.0 years (10.0 ttl pk-yrs)     Types: Cigarettes     Start date: 1990     Quit date: 2010     Years since quittin.9     Passive exposure: Past    Smokeless tobacco: Never   Substance Use Topics    Alcohol use: No        Vitals:    24 1624   BP: 120/74   Site: Left Upper Arm   Position: Sitting   Pulse: 61   Resp: 16   Temp: 97.1 °F (36.2 °C)   TempSrc: Temporal   SpO2: 98%   Weight: 92.5 kg (204 lb)     Estimated

## 2024-02-07 ENCOUNTER — OFFICE VISIT (OUTPATIENT)
Dept: FAMILY MEDICINE CLINIC | Age: 64
End: 2024-02-07
Payer: COMMERCIAL

## 2024-02-07 VITALS
BODY MASS INDEX: 34.78 KG/M2 | DIASTOLIC BLOOD PRESSURE: 76 MMHG | TEMPERATURE: 97 F | SYSTOLIC BLOOD PRESSURE: 124 MMHG | HEART RATE: 67 BPM | OXYGEN SATURATION: 98 % | WEIGHT: 209 LBS | RESPIRATION RATE: 16 BRPM

## 2024-02-07 DIAGNOSIS — H81.10 BENIGN PAROXYSMAL POSITIONAL VERTIGO, UNSPECIFIED LATERALITY: Primary | ICD-10-CM

## 2024-02-07 DIAGNOSIS — J30.9 ALLERGIC SINUSITIS: ICD-10-CM

## 2024-02-07 DIAGNOSIS — H69.90 DYSFUNCTION OF EUSTACHIAN TUBE, UNSPECIFIED LATERALITY: ICD-10-CM

## 2024-02-07 PROCEDURE — 99213 OFFICE O/P EST LOW 20 MIN: CPT | Performed by: NURSE PRACTITIONER

## 2024-02-07 RX ORDER — METHYLPREDNISOLONE 4 MG/1
TABLET ORAL
Qty: 1 KIT | Refills: 0 | Status: SHIPPED | OUTPATIENT
Start: 2024-02-07

## 2024-02-07 ASSESSMENT — ANXIETY QUESTIONNAIRES
1. FEELING NERVOUS, ANXIOUS, OR ON EDGE: 0
5. BEING SO RESTLESS THAT IT IS HARD TO SIT STILL: 0
6. BECOMING EASILY ANNOYED OR IRRITABLE: 0
GAD7 TOTAL SCORE: 0
4. TROUBLE RELAXING: 0
IF YOU CHECKED OFF ANY PROBLEMS ON THIS QUESTIONNAIRE, HOW DIFFICULT HAVE THESE PROBLEMS MADE IT FOR YOU TO DO YOUR WORK, TAKE CARE OF THINGS AT HOME, OR GET ALONG WITH OTHER PEOPLE: NOT DIFFICULT AT ALL
3. WORRYING TOO MUCH ABOUT DIFFERENT THINGS: 0
7. FEELING AFRAID AS IF SOMETHING AWFUL MIGHT HAPPEN: 0

## 2024-02-07 ASSESSMENT — ENCOUNTER SYMPTOMS
EYE REDNESS: 0
SINUS PRESSURE: 1
RHINORRHEA: 0
EYES NEGATIVE: 1
SORE THROAT: 0
DIARRHEA: 0
COUGH: 0
SINUS PAIN: 0
NAUSEA: 0
FACIAL SWELLING: 0
GASTROINTESTINAL NEGATIVE: 1
RESPIRATORY NEGATIVE: 1
SHORTNESS OF BREATH: 0
VOMITING: 0

## 2024-02-07 ASSESSMENT — PATIENT HEALTH QUESTIONNAIRE - PHQ9
SUM OF ALL RESPONSES TO PHQ9 QUESTIONS 1 & 2: 0
SUM OF ALL RESPONSES TO PHQ QUESTIONS 1-9: 0
2. FEELING DOWN, DEPRESSED OR HOPELESS: 0
SUM OF ALL RESPONSES TO PHQ QUESTIONS 1-9: 0
1. LITTLE INTEREST OR PLEASURE IN DOING THINGS: 0

## 2024-02-07 NOTE — PROGRESS NOTES
2/7/2024    This is a 63 y.o. female   Chief Complaint   Patient presents with    Dizziness     Positional dizziness down and up is worse, world spinning. For about a month    .    Ana Maria presents today with complaints of vertigo. She was seen 1/23/24 for similar complains and prescribed Augmentin and recommended flonase and zyrtec. She completed Augmentin and stated symptoms began to improve on day 8, however, vertigo persists. She did not take flonase or zyrtec due to recommendation from ENT a year ago and concerns for worsening tinnitus. Her vertigo is mainly positional with movement of head up and down, denies symptoms when moving head left to right. An example was when she leaned forward to put something in her bag and had to sit down due to the \"world spinning.\" She also notes crackling in ears mainly associated with tilting head left or right and constant post nasal drip.          Patient Active Problem List   Diagnosis    Hypothyroidism due to Hashimoto's thyroiditis    Morbid obesity (HCC)    Foreign body, hand, superficial, left, initial encounter    Postmenopausal    Tinnitus, bilateral    Carpal tunnel syndrome on left       Current Outpatient Medications   Medication Sig Dispense Refill    methylPREDNISolone (MEDROL DOSEPACK) 4 MG tablet Take by mouth. 1 kit 0    SYNTHROID 100 MCG tablet Take 1 tablet by mouth Daily 90 tablet 1     No current facility-administered medications for this visit.       Allergies   Allergen Reactions    Eggs Or Egg-Derived Products Nausea And Vomiting    Keflex [Cephalexin] Diarrhea     Abdominal cramping, diarrhea       /76   Pulse 67   Temp 97 °F (36.1 °C)   Resp 16   Wt 94.8 kg (209 lb)   SpO2 98%   BMI 34.78 kg/m²     Social History     Tobacco Use    Smoking status: Former     Current packs/day: 0.00     Average packs/day: 0.5 packs/day for 20.0 years (10.0 ttl pk-yrs)     Types: Cigarettes     Start date: 2/1/1990     Quit date: 2/1/2010     Years since

## 2024-03-01 ENCOUNTER — OFFICE VISIT (OUTPATIENT)
Dept: FAMILY MEDICINE CLINIC | Age: 64
End: 2024-03-01
Payer: COMMERCIAL

## 2024-03-01 VITALS
TEMPERATURE: 97 F | BODY MASS INDEX: 34.28 KG/M2 | SYSTOLIC BLOOD PRESSURE: 128 MMHG | OXYGEN SATURATION: 98 % | RESPIRATION RATE: 15 BRPM | WEIGHT: 206 LBS | HEART RATE: 78 BPM | DIASTOLIC BLOOD PRESSURE: 84 MMHG

## 2024-03-01 DIAGNOSIS — K59.00 CONSTIPATION, UNSPECIFIED CONSTIPATION TYPE: Primary | ICD-10-CM

## 2024-03-01 PROCEDURE — 99213 OFFICE O/P EST LOW 20 MIN: CPT | Performed by: NURSE PRACTITIONER

## 2024-03-01 RX ORDER — CETIRIZINE HYDROCHLORIDE 5 MG/1
5 TABLET ORAL DAILY
COMMUNITY

## 2024-03-01 SDOH — ECONOMIC STABILITY: TRANSPORTATION INSECURITY
IN THE PAST 12 MONTHS, HAS LACK OF TRANSPORTATION KEPT YOU FROM MEETINGS, WORK, OR FROM GETTING THINGS NEEDED FOR DAILY LIVING?: NO

## 2024-03-01 SDOH — ECONOMIC STABILITY: FOOD INSECURITY: WITHIN THE PAST 12 MONTHS, YOU WORRIED THAT YOUR FOOD WOULD RUN OUT BEFORE YOU GOT MONEY TO BUY MORE.: NEVER TRUE

## 2024-03-01 SDOH — ECONOMIC STABILITY: FOOD INSECURITY: WITHIN THE PAST 12 MONTHS, THE FOOD YOU BOUGHT JUST DIDN'T LAST AND YOU DIDN'T HAVE MONEY TO GET MORE.: NEVER TRUE

## 2024-03-01 SDOH — ECONOMIC STABILITY: INCOME INSECURITY: HOW HARD IS IT FOR YOU TO PAY FOR THE VERY BASICS LIKE FOOD, HOUSING, MEDICAL CARE, AND HEATING?: NOT HARD AT ALL

## 2024-03-01 ASSESSMENT — ANXIETY QUESTIONNAIRES
5. BEING SO RESTLESS THAT IT IS HARD TO SIT STILL: NOT AT ALL
3. WORRYING TOO MUCH ABOUT DIFFERENT THINGS: 0
2. NOT BEING ABLE TO STOP OR CONTROL WORRYING: 0
5. BEING SO RESTLESS THAT IT IS HARD TO SIT STILL: 0
4. TROUBLE RELAXING: 0
7. FEELING AFRAID AS IF SOMETHING AWFUL MIGHT HAPPEN: 0
2. NOT BEING ABLE TO STOP OR CONTROL WORRYING: NOT AT ALL
3. WORRYING TOO MUCH ABOUT DIFFERENT THINGS: NOT AT ALL
GAD7 TOTAL SCORE: 0
7. FEELING AFRAID AS IF SOMETHING AWFUL MIGHT HAPPEN: NOT AT ALL
1. FEELING NERVOUS, ANXIOUS, OR ON EDGE: NOT AT ALL
6. BECOMING EASILY ANNOYED OR IRRITABLE: NOT AT ALL
4. TROUBLE RELAXING: NOT AT ALL
1. FEELING NERVOUS, ANXIOUS, OR ON EDGE: 0
6. BECOMING EASILY ANNOYED OR IRRITABLE: 0

## 2024-03-01 ASSESSMENT — ENCOUNTER SYMPTOMS
RESPIRATORY NEGATIVE: 1
ABDOMINAL PAIN: 1
DIARRHEA: 0
NAUSEA: 0
CONSTIPATION: 1
ABDOMINAL DISTENTION: 0
VOMITING: 0
RECTAL PAIN: 0

## 2024-03-01 ASSESSMENT — PATIENT HEALTH QUESTIONNAIRE - PHQ9
SUM OF ALL RESPONSES TO PHQ QUESTIONS 1-9: 0
SUM OF ALL RESPONSES TO PHQ9 QUESTIONS 1 & 2: 0
1. LITTLE INTEREST OR PLEASURE IN DOING THINGS: NOT AT ALL
1. LITTLE INTEREST OR PLEASURE IN DOING THINGS: 0
2. FEELING DOWN, DEPRESSED OR HOPELESS: 0
2. FEELING DOWN, DEPRESSED OR HOPELESS: NOT AT ALL
SUM OF ALL RESPONSES TO PHQ QUESTIONS 1-9: 0
SUM OF ALL RESPONSES TO PHQ9 QUESTIONS 1 & 2: 0

## 2024-03-01 NOTE — PATIENT INSTRUCTIONS
Miralax- 1 cap full 2 times daily for 2-3 days, then only 1 cap daily until regular/soft bowel movements, then 1/2 cap full until back to normal routine  Eat plenty of raw fruits and vegetables(can lightly steam or roast)  Drink plenty of water, stay hydrated, at least 90oz/day  Continue probiotic

## 2024-03-01 NOTE — PROGRESS NOTES
Coordination: Coordination normal.   Psychiatric:         Behavior: Behavior normal.         Thought Content: Thought content normal.         Judgment: Judgment normal.       Diagnosis       ICD-10-CM    1. Constipation, unspecified constipation type  K59.00            Plan    Miralax- 1 cap full 2 times daily for 2-3 days, then only 1 cap daily until regular/soft bowel movements, then 1/2 cap full until back to normal routine  Eat plenty of raw fruits and vegetables  Drink plenty of water, stay hydrated, at least 90oz/day  Continue probiotic    No orders of the defined types were placed in this encounter.      No orders of the defined types were placed in this encounter.      Patient Education:  plan    Return if symptoms worsen or fail to improve.

## 2024-03-29 RX ORDER — LEVOTHYROXINE SODIUM 100 MCG
100 TABLET ORAL DAILY
Qty: 90 TABLET | Refills: 1 | Status: SHIPPED | OUTPATIENT
Start: 2024-03-29

## 2024-04-23 DIAGNOSIS — E03.9 ACQUIRED HYPOTHYROIDISM: ICD-10-CM

## 2024-04-23 LAB — TSH SERPL DL<=0.005 MIU/L-ACNC: 1.89 UIU/ML (ref 0.27–4.2)

## 2024-07-01 ENCOUNTER — TELEPHONE (OUTPATIENT)
Dept: FAMILY MEDICINE CLINIC | Age: 64
End: 2024-07-01

## 2024-07-01 NOTE — TELEPHONE ENCOUNTER
Patient is calling in for a refill but she is in [place] for the summer, she is asking if its possible to be sent to the pharmacy where she is. Please advise. If not possible please call.             SYNTHROID 100 MCG tablet    90 days    [address] [address]

## 2024-07-01 NOTE — TELEPHONE ENCOUNTER
Unable to leave message pharmacy is not located in [facility] we need a different pharmacy

## 2024-07-03 NOTE — TELEPHONE ENCOUNTER
Patient called back with different pharmacy       [facility] [address] [place] [address]     Phone number [phone]

## 2024-07-05 RX ORDER — LEVOTHYROXINE SODIUM 100 MCG
100 TABLET ORAL DAILY
Qty: 90 TABLET | Refills: 1 | Status: SHIPPED | OUTPATIENT
Start: 2024-07-05

## 2024-07-05 NOTE — TELEPHONE ENCOUNTER
Called [facility] [address] [place] [address] [place] [id number]      Phone number [phone]    Not open yet.  Will call back.

## 2025-01-08 ENCOUNTER — TELEPHONE (OUTPATIENT)
Dept: FAMILY MEDICINE CLINIC | Age: 65
End: 2025-01-08

## 2025-01-08 NOTE — TELEPHONE ENCOUNTER
Patient needs to establish with new provider I left message on recorder for her call us back if she wants to stay here to get scheduled as NTP appt before we can fill her medication

## (undated) DEVICE — NEEDLE HYPO 25GA L1.5IN BLU POLYPR HUB S STL REG BVL STR

## (undated) DEVICE — GLOVE ORANGE PI 7   MSG9070

## (undated) DEVICE — GLOVE,SURG,SENSICARE,ALOE,LF,PF,7: Brand: MEDLINE

## (undated) DEVICE — WILLIS PACK: Brand: MEDLINE INDUSTRIES, INC.

## (undated) DEVICE — SUTURE VCRL + SZ 3-0 L27IN ABSRB UD L26MM SH 1/2 CIR VCP416H

## (undated) DEVICE — PADDING CAST W4INXL4YD NONSTERILE COT RAYON MICROPLEATED

## (undated) DEVICE — PADDING CAST W4INXL4YD ST COT RAYON MICROPLEATED HIGHLY

## (undated) DEVICE — SOLUTION IV IRRIG 500ML 0.9% SODIUM CHL 2F7123

## (undated) DEVICE — BANDAGE COMPR W4INXL5YD BGE HI E W/ REM CLP SURE-WRAP

## (undated) DEVICE — COVER LT HNDL PLAS RIG 2 PER PK

## (undated) DEVICE — UNDERGLOVE SURG SZ 8 FNGR THK0.21MIL GRN LTX BEAD CUF

## (undated) DEVICE — SYRINGE MED 10ML LUERLOCK TIP W/O SFTY DISP

## (undated) DEVICE — TOWEL,OR,DSP,ST,BLUE,STD,8/PK,10PK/CS: Brand: MEDLINE